# Patient Record
Sex: MALE | Race: WHITE | Employment: OTHER | URBAN - METROPOLITAN AREA
[De-identification: names, ages, dates, MRNs, and addresses within clinical notes are randomized per-mention and may not be internally consistent; named-entity substitution may affect disease eponyms.]

---

## 2017-02-27 RX ORDER — LEVOTHYROXINE SODIUM 0.1 MG/1
TABLET ORAL
Qty: 30 TABLET | Refills: 1 | Status: SHIPPED | OUTPATIENT
Start: 2017-02-27 | End: 2017-05-04 | Stop reason: SDUPTHER

## 2017-03-22 ENCOUNTER — OFFICE VISIT (OUTPATIENT)
Dept: FAMILY MEDICINE CLINIC | Age: 67
End: 2017-03-22

## 2017-03-22 VITALS
WEIGHT: 191 LBS | HEART RATE: 68 BPM | DIASTOLIC BLOOD PRESSURE: 82 MMHG | BODY MASS INDEX: 25.87 KG/M2 | SYSTOLIC BLOOD PRESSURE: 124 MMHG | TEMPERATURE: 97.7 F | HEIGHT: 72 IN | RESPIRATION RATE: 20 BRPM

## 2017-03-22 DIAGNOSIS — R97.20 ELEVATED PSA: ICD-10-CM

## 2017-03-22 DIAGNOSIS — I10 ESSENTIAL HYPERTENSION: Primary | ICD-10-CM

## 2017-03-22 DIAGNOSIS — E78.5 HYPERLIPIDEMIA, UNSPECIFIED HYPERLIPIDEMIA TYPE: ICD-10-CM

## 2017-03-22 DIAGNOSIS — E03.8 OTHER SPECIFIED HYPOTHYROIDISM: ICD-10-CM

## 2017-03-22 DIAGNOSIS — I10 ESSENTIAL HYPERTENSION: ICD-10-CM

## 2017-03-22 LAB
ALBUMIN SERPL-MCNC: 4.4 G/DL (ref 3.9–4.9)
ALP BLD-CCNC: 53 U/L (ref 35–104)
ALT SERPL-CCNC: 16 U/L (ref 0–41)
ANION GAP SERPL CALCULATED.3IONS-SCNC: 10 MEQ/L (ref 7–13)
AST SERPL-CCNC: 16 U/L (ref 0–40)
BASOPHILS ABSOLUTE: 0 K/UL (ref 0–0.2)
BASOPHILS RELATIVE PERCENT: 1 %
BILIRUB SERPL-MCNC: 0.8 MG/DL (ref 0–1.2)
BUN BLDV-MCNC: 15 MG/DL (ref 8–23)
CALCIUM SERPL-MCNC: 9.4 MG/DL (ref 8.6–10.2)
CHLORIDE BLD-SCNC: 103 MEQ/L (ref 98–107)
CHOLESTEROL, TOTAL: 193 MG/DL (ref 0–199)
CO2: 27 MEQ/L (ref 22–29)
CREAT SERPL-MCNC: 1.08 MG/DL (ref 0.7–1.2)
EOSINOPHILS ABSOLUTE: 0.2 K/UL (ref 0–0.7)
EOSINOPHILS RELATIVE PERCENT: 3.7 %
GFR AFRICAN AMERICAN: >60
GFR NON-AFRICAN AMERICAN: >60
GLOBULIN: 2 G/DL (ref 2.3–3.5)
GLUCOSE BLD-MCNC: 97 MG/DL (ref 74–109)
HCT VFR BLD CALC: 43.1 % (ref 42–52)
HDLC SERPL-MCNC: 42 MG/DL (ref 40–59)
HEMOGLOBIN: 14.6 G/DL (ref 14–18)
LDL CHOLESTEROL CALCULATED: 135 MG/DL (ref 0–129)
LYMPHOCYTES ABSOLUTE: 1.2 K/UL (ref 1–4.8)
LYMPHOCYTES RELATIVE PERCENT: 25.6 %
MCH RBC QN AUTO: 29.9 PG (ref 27–31.3)
MCHC RBC AUTO-ENTMCNC: 33.8 % (ref 33–37)
MCV RBC AUTO: 88.6 FL (ref 80–100)
MONOCYTES ABSOLUTE: 0.4 K/UL (ref 0.2–0.8)
MONOCYTES RELATIVE PERCENT: 8.6 %
NEUTROPHILS ABSOLUTE: 2.9 K/UL (ref 1.4–6.5)
NEUTROPHILS RELATIVE PERCENT: 61.1 %
PDW BLD-RTO: 13.6 % (ref 11.5–14.5)
PLATELET # BLD: 165 K/UL (ref 130–400)
POTASSIUM SERPL-SCNC: 4.4 MEQ/L (ref 3.5–5.1)
RBC # BLD: 4.87 M/UL (ref 4.7–6.1)
SODIUM BLD-SCNC: 140 MEQ/L (ref 132–144)
T4 FREE: 1.38 NG/DL (ref 0.93–1.7)
TOTAL PROTEIN: 6.4 G/DL (ref 6.4–8.1)
TRIGL SERPL-MCNC: 79 MG/DL (ref 0–200)
WBC # BLD: 4.7 K/UL (ref 4.8–10.8)

## 2017-03-22 PROCEDURE — G8427 DOCREV CUR MEDS BY ELIG CLIN: HCPCS | Performed by: FAMILY MEDICINE

## 2017-03-22 PROCEDURE — G8419 CALC BMI OUT NRM PARAM NOF/U: HCPCS | Performed by: FAMILY MEDICINE

## 2017-03-22 PROCEDURE — 99213 OFFICE O/P EST LOW 20 MIN: CPT | Performed by: FAMILY MEDICINE

## 2017-03-22 PROCEDURE — 1036F TOBACCO NON-USER: CPT | Performed by: FAMILY MEDICINE

## 2017-03-22 PROCEDURE — G8484 FLU IMMUNIZE NO ADMIN: HCPCS | Performed by: FAMILY MEDICINE

## 2017-03-22 PROCEDURE — 3017F COLORECTAL CA SCREEN DOC REV: CPT | Performed by: FAMILY MEDICINE

## 2017-03-22 RX ORDER — LISINOPRIL 10 MG/1
TABLET ORAL
Qty: 90 TABLET | Refills: 1 | Status: SHIPPED | OUTPATIENT
Start: 2017-03-22 | End: 2017-10-04 | Stop reason: SDUPTHER

## 2017-03-22 ASSESSMENT — ENCOUNTER SYMPTOMS
EYE DISCHARGE: 0
COUGH: 0
CONSTIPATION: 0
SHORTNESS OF BREATH: 0
ABDOMINAL PAIN: 0
DIARRHEA: 0
SORE THROAT: 0
SINUS PRESSURE: 0
EYE ITCHING: 0

## 2017-05-04 RX ORDER — LEVOTHYROXINE SODIUM 0.1 MG/1
TABLET ORAL
Qty: 30 TABLET | Refills: 5 | Status: SHIPPED | OUTPATIENT
Start: 2017-05-04 | End: 2017-11-08 | Stop reason: SDUPTHER

## 2017-05-25 RX ORDER — IBUPROFEN 800 MG/1
TABLET ORAL
Qty: 90 TABLET | Refills: 0 | Status: SHIPPED | OUTPATIENT
Start: 2017-05-25 | End: 2017-08-17 | Stop reason: SDUPTHER

## 2017-06-02 ENCOUNTER — OFFICE VISIT (OUTPATIENT)
Dept: UROLOGY | Age: 67
End: 2017-06-02

## 2017-06-02 VITALS
WEIGHT: 190 LBS | SYSTOLIC BLOOD PRESSURE: 128 MMHG | HEIGHT: 71 IN | DIASTOLIC BLOOD PRESSURE: 70 MMHG | HEART RATE: 78 BPM | BODY MASS INDEX: 26.6 KG/M2

## 2017-06-02 DIAGNOSIS — N40.1 BENIGN NON-NODULAR PROSTATIC HYPERPLASIA WITH LOWER URINARY TRACT SYMPTOMS: Primary | ICD-10-CM

## 2017-06-02 PROCEDURE — 99214 OFFICE O/P EST MOD 30 MIN: CPT | Performed by: UROLOGY

## 2017-06-02 RX ORDER — TAMSULOSIN HYDROCHLORIDE 0.4 MG/1
0.4 CAPSULE ORAL DAILY
Qty: 90 CAPSULE | Refills: 3 | Status: SHIPPED | OUTPATIENT
Start: 2017-06-02 | End: 2018-06-08 | Stop reason: SDUPTHER

## 2017-06-02 RX ORDER — FINASTERIDE 5 MG/1
5 TABLET, FILM COATED ORAL DAILY
Qty: 90 TABLET | Refills: 3 | Status: SHIPPED | OUTPATIENT
Start: 2017-06-02 | End: 2018-06-08 | Stop reason: SDUPTHER

## 2017-08-18 RX ORDER — IBUPROFEN 800 MG/1
TABLET ORAL
Qty: 90 TABLET | Refills: 1 | Status: SHIPPED | OUTPATIENT
Start: 2017-08-18 | End: 2017-11-06 | Stop reason: SDUPTHER

## 2017-10-04 ENCOUNTER — OFFICE VISIT (OUTPATIENT)
Dept: FAMILY MEDICINE CLINIC | Age: 67
End: 2017-10-04

## 2017-10-04 VITALS
SYSTOLIC BLOOD PRESSURE: 114 MMHG | TEMPERATURE: 97.1 F | BODY MASS INDEX: 25.19 KG/M2 | HEART RATE: 81 BPM | DIASTOLIC BLOOD PRESSURE: 78 MMHG | HEIGHT: 72 IN | WEIGHT: 186 LBS

## 2017-10-04 DIAGNOSIS — I10 ESSENTIAL HYPERTENSION: Primary | ICD-10-CM

## 2017-10-04 DIAGNOSIS — E78.5 HYPERLIPIDEMIA, UNSPECIFIED HYPERLIPIDEMIA TYPE: ICD-10-CM

## 2017-10-04 DIAGNOSIS — E03.9 HYPOTHYROIDISM, UNSPECIFIED TYPE: ICD-10-CM

## 2017-10-04 DIAGNOSIS — Z23 NEED FOR PNEUMOCOCCAL VACCINATION: ICD-10-CM

## 2017-10-04 DIAGNOSIS — I10 ESSENTIAL HYPERTENSION: ICD-10-CM

## 2017-10-04 LAB
ALBUMIN SERPL-MCNC: 4.4 G/DL (ref 3.9–4.9)
ALP BLD-CCNC: 50 U/L (ref 35–104)
ALT SERPL-CCNC: 16 U/L (ref 0–41)
ANION GAP SERPL CALCULATED.3IONS-SCNC: 15 MEQ/L (ref 7–13)
AST SERPL-CCNC: 17 U/L (ref 0–40)
BASOPHILS ABSOLUTE: 0.1 K/UL (ref 0–0.2)
BASOPHILS RELATIVE PERCENT: 1.7 %
BILIRUB SERPL-MCNC: 0.6 MG/DL (ref 0–1.2)
BUN BLDV-MCNC: 27 MG/DL (ref 8–23)
CALCIUM SERPL-MCNC: 9.2 MG/DL (ref 8.6–10.2)
CHLORIDE BLD-SCNC: 105 MEQ/L (ref 98–107)
CHOLESTEROL, TOTAL: 175 MG/DL (ref 0–199)
CO2: 22 MEQ/L (ref 22–29)
CREAT SERPL-MCNC: 0.96 MG/DL (ref 0.7–1.2)
EOSINOPHILS ABSOLUTE: 0.4 K/UL (ref 0–0.7)
EOSINOPHILS RELATIVE PERCENT: 6.5 %
GFR AFRICAN AMERICAN: >60
GFR NON-AFRICAN AMERICAN: >60
GLOBULIN: 2.3 G/DL (ref 2.3–3.5)
GLUCOSE BLD-MCNC: 101 MG/DL (ref 74–109)
HCT VFR BLD CALC: 44.3 % (ref 42–52)
HDLC SERPL-MCNC: 46 MG/DL (ref 40–59)
HEMOGLOBIN: 14.6 G/DL (ref 14–18)
LDL CHOLESTEROL CALCULATED: 116 MG/DL (ref 0–129)
LYMPHOCYTES ABSOLUTE: 1.2 K/UL (ref 1–4.8)
LYMPHOCYTES RELATIVE PERCENT: 20.5 %
MCH RBC QN AUTO: 30 PG (ref 27–31.3)
MCHC RBC AUTO-ENTMCNC: 32.9 % (ref 33–37)
MCV RBC AUTO: 91.3 FL (ref 80–100)
MONOCYTES ABSOLUTE: 0.4 K/UL (ref 0.2–0.8)
MONOCYTES RELATIVE PERCENT: 7.4 %
NEUTROPHILS ABSOLUTE: 3.7 K/UL (ref 1.4–6.5)
NEUTROPHILS RELATIVE PERCENT: 63.9 %
PDW BLD-RTO: 13.7 % (ref 11.5–14.5)
PLATELET # BLD: 170 K/UL (ref 130–400)
POTASSIUM SERPL-SCNC: 4.3 MEQ/L (ref 3.5–5.1)
RBC # BLD: 4.86 M/UL (ref 4.7–6.1)
SODIUM BLD-SCNC: 142 MEQ/L (ref 132–144)
T4 FREE: 1.47 NG/DL (ref 0.93–1.7)
TOTAL PROTEIN: 6.7 G/DL (ref 6.4–8.1)
TRIGL SERPL-MCNC: 63 MG/DL (ref 0–200)
WBC # BLD: 5.8 K/UL (ref 4.8–10.8)

## 2017-10-04 PROCEDURE — 99213 OFFICE O/P EST LOW 20 MIN: CPT | Performed by: FAMILY MEDICINE

## 2017-10-04 PROCEDURE — G8484 FLU IMMUNIZE NO ADMIN: HCPCS | Performed by: FAMILY MEDICINE

## 2017-10-04 PROCEDURE — 90670 PCV13 VACCINE IM: CPT | Performed by: FAMILY MEDICINE

## 2017-10-04 PROCEDURE — G8417 CALC BMI ABV UP PARAM F/U: HCPCS | Performed by: FAMILY MEDICINE

## 2017-10-04 PROCEDURE — G8427 DOCREV CUR MEDS BY ELIG CLIN: HCPCS | Performed by: FAMILY MEDICINE

## 2017-10-04 PROCEDURE — G0009 ADMIN PNEUMOCOCCAL VACCINE: HCPCS | Performed by: FAMILY MEDICINE

## 2017-10-04 PROCEDURE — 3017F COLORECTAL CA SCREEN DOC REV: CPT | Performed by: FAMILY MEDICINE

## 2017-10-04 PROCEDURE — 1123F ACP DISCUSS/DSCN MKR DOCD: CPT | Performed by: FAMILY MEDICINE

## 2017-10-04 PROCEDURE — 1036F TOBACCO NON-USER: CPT | Performed by: FAMILY MEDICINE

## 2017-10-04 PROCEDURE — 4040F PNEUMOC VAC/ADMIN/RCVD: CPT | Performed by: FAMILY MEDICINE

## 2017-10-04 RX ORDER — LISINOPRIL 10 MG/1
TABLET ORAL
Qty: 90 TABLET | Refills: 1 | Status: SHIPPED | OUTPATIENT
Start: 2017-10-04 | End: 2018-04-09 | Stop reason: SDUPTHER

## 2017-10-04 ASSESSMENT — ENCOUNTER SYMPTOMS
DIARRHEA: 0
CONSTIPATION: 0
SHORTNESS OF BREATH: 0
EYE ITCHING: 0
SORE THROAT: 0
SINUS PRESSURE: 0
ABDOMINAL PAIN: 0
EYE DISCHARGE: 0
COUGH: 0

## 2017-10-04 ASSESSMENT — PATIENT HEALTH QUESTIONNAIRE - PHQ9
SUM OF ALL RESPONSES TO PHQ QUESTIONS 1-9: 0
SUM OF ALL RESPONSES TO PHQ9 QUESTIONS 1 & 2: 0
2. FEELING DOWN, DEPRESSED OR HOPELESS: 0
1. LITTLE INTEREST OR PLEASURE IN DOING THINGS: 0

## 2017-10-04 NOTE — MR AVS SNAPSHOT
After Visit Summary             Yvette Barrow.   10/4/2017 8:00 AM   Office Visit    Description:  Male : 1950   Provider:  Jr Coronado MD   Department:  [address] [place] [address] PCP              Your Follow-Up and Future Appointments         Below is a list of your follow-up and future appointments. This may not be a complete list as you may have made appointments directly with providers that we are not aware of or your providers may have made some for you. Please call your providers to confirm appointments. It is important to keep your appointments. Please bring your current insurance card, photo ID, co-pay, and all medication bottles to your appointment. If self-pay, payment is expected at the time of service. Your To-Do List     Future Appointments Provider Department Dept Phone    2018 8:00 AM Jr Coronado MD [address] [place] [address] [phone]    Please arrive 15 minutes prior to appointment, bring photo ID and insurance card. 2018 9:00 AM Issa Dukes MD [facility] AT [facility] [phone]    Please arrive 15 minutes prior to appointment, bring photo ID and insurance card. Future Orders Complete By Expires    CBC With Auto Differential [YCX5897 Custom]  10/4/2017 10/4/2018    Comprehensive Metabolic Panel [SPU07 Custom]  10/4/2017 10/4/2018    Lipid Panel [LAB18 Custom]  10/4/2017 10/4/2018    T4, Free [GQW582 Custom]  10/4/2017 10/4/2018    Follow-Up    Return in about 6 months (around 2018).          Information from Your Visit        Department     Name Address Phone Fax    [facility] [address] PCP [address] [place] [address], [place] [address] [address] [place] [address] [phone]      You Were Seen for:         Comments    Essential hypertension   [732646]         Vital Signs     Blood Pressure Pulse Temperature Height Weight Body Mass Index    114/78 81 97.1 °F (36.2 °C) (Temporal) 6' (1.829 m) 186 lb (84.4 kg) 25.23 kg/m2 Smoking Status                   Never Smoker           Additional Information about your Body Mass Index (BMI)           Your BMI as listed above is considered overweight (25.0-29.9). BMI is an estimate of body fat, calculated from your height and weight. The higher your BMI, the greater your risk of heart disease, high blood pressure, type 2 diabetes, stroke, gallstones, arthritis, sleep apnea, and certain cancers. BMI is not perfect. It may overestimate body fat in athletes and people who are more muscular. If your body fat is high you can improve your BMI by decreasing your calorie intake and becoming more physically active. Learn more at: [email or web address]             Where to Get Your Medications      These medications were sent to [facility] [address] [facility] [address] [place] [address] [place] [facility]", [address]  [address], [facility] [address]     Phone:  [phone]     lisinopril 10 MG tablet         Your Current Medications Are              lisinopril (PRINIVIL;ZESTRIL) 10 MG tablet Take 1 tablet by mouth daily. ibuprofen (ADVIL;MOTRIN) 800 MG tablet TAKE 1 TABLET EVERY 8 HOURS AS NEEDED FOR PAIN    tamsulosin (FLOMAX) 0.4 MG capsule Take 1 capsule by mouth daily    finasteride (PROSCAR) 5 MG tablet Take 1 tablet by mouth daily    Avanafil 200 MG TABS Take 200 mg by mouth daily as needed (as needed)    levothyroxine (SYNTHROID) 100 MCG tablet Take 1 tablet by mouth Daily    Suvorexant (BELSOMRA) 20 MG TABS Take 1 tablet by mouth nightly as needed (insomnia)    Cholecalciferol (VITAMIN D3) 5000 UNITS TABS Take by mouth daily    Pyridoxine HCl (VITAMIN B-6 PO) Take  by mouth. Ascorbic Acid (VITAMIN C) 500 MG CAPS Take 500 mg by mouth daily. Bilberry, Vaccinium myrtillus, (BILBERRY PO) Take 1,000 mg by mouth daily. Omega-3 Fatty Acids (OMEGA 3 PO) Take  by mouth. Cyanocobalamin (VITAMIN B-12 PO) Take  by mouth.         Allergies Bee Venom Swelling      We Ordered/Performed the following           Pneumococcal conjugate vaccine 13-valent     Comments:    NDC 7345-8118-64 Use for package 10 PREFILLED SYRINGES PER PACKAGE           Additional Information        Basic Information     Date Of Birth Sex Race Ethnicity Preferred Language    1950 Male White Non-/Non  English      Problem List as of 10/4/2017  Date Reviewed: 3/22/2017                Essential hypertension    Rotator cuff syndrome of left shoulder    Hypothyroidism    Elevated PSA    BPH (benign prostatic hypertrophy)    Osteoarthritis    Erectile dysfunction    Hyperlipidemia      Immunizations as of 10/4/2017     Name Date    Pneumococcal 13-valent Conjugate (Scott Khat) 10/4/2017    Tdap (Boostrix, Adacel) 12/30/2013      Preventive Care        Date Due    Zoster Vaccine 10/10/2017 (Originally 5/6/2012)    Yearly Flu Vaccine (1) 10/10/2017 (Originally 9/1/2017)    Hepatitis C screening is recommended for all adults regardless of risk factors born between [facility] at least once (lifetime) who have never been tested. 10/10/2017 (Originally 1950)    HIV screening is recommended for all people regardless of risk factors  aged 15-65 years at least once (lifetime) who have never been HIV tested. 10/10/2017 (Originally 5/6/1967)    Pneumococcal Vaccines (two) for all adults aged 72 and over (2 of 2 - PPSV23) 10/4/2018    Cholesterol Screening 3/22/2022    Colonoscopy 5/13/2023    Tetanus Combination Vaccine (2 - Td) 12/30/2023            RIWIt Signup           Our records indicate that you have an active Tripbod account. You can view your After Visit Summary by going to [email or web address]. and logging in with your Tripbod username and password.       If you don't have a Tripbod username and password but a parent or guardian has access to your record, the parent or guardian should login

## 2017-10-04 NOTE — PROGRESS NOTES
Subjective  Chloe De La O., 74 y.o. male presents today with:  No chief complaint on file. HPI    Patient here for follow-up of hypertension hyperlipidemia doing well no complaints. Diabetes is uncontrolled. BP Readings from Last 3 Encounters:   06/02/17 128/70   03/22/17 124/82   09/30/16 124/82     Hypertension:  Home blood pressure monitoring: Yes - . He is adherent to a low sodium diet. Patient denies chest pain. Antihypertensive medication side effects: no medication side effects noted. Use of agents associated with hypertension: none. Sodium (mEq/L)   Date Value   03/22/2017 140    BUN (mg/dL)   Date Value   03/22/2017 15    Glucose (mg/dL)   Date Value   03/22/2017 97   03/30/2012 112      Potassium (mEq/L)   Date Value   03/22/2017 4.4    CREATININE (mg/dL)   Date Value   03/22/2017 1.08         Lab Results   Component Value Date    CHOL 193 03/22/2017    TRIG 79 03/22/2017    HDL 42 03/22/2017    LDLCALC 135 (H) 03/22/2017     Lab Results   Component Value Date    ALT 16 03/22/2017    AST 16 03/22/2017          No other questions and or concerns for today's visit      Review of Systems   Constitutional: Negative for appetite change, fatigue and fever. HENT: Negative for congestion, ear pain, sinus pressure and sore throat. Eyes: Negative for discharge and itching. Respiratory: Negative for cough and shortness of breath. Cardiovascular: Negative for chest pain and palpitations. Gastrointestinal: Negative for abdominal pain, constipation and diarrhea. Endocrine: Negative for polydipsia. Genitourinary: Negative for difficulty urinating. Musculoskeletal: Negative for gait problem. Skin: Negative. Neurological: Negative for dizziness. Hematological: Negative. Psychiatric/Behavioral: Negative.           Past Medical History:   Diagnosis Date    Borderline hypertension     BPH (benign prostatic hypertrophy)     Elevated PSA  Erectile dysfunction     HTN (hypertension) 1/19/2015    Hyperlipidemia     Hypothyroidism     Osteoarthritis     Rotator cuff syndrome of left shoulder 9/30/2013     Past Surgical History:   Procedure Laterality Date    COLONOSCOPY  05/13/2013    DR. GIRON [address]      right     Social History     Social History    Marital status: Single     Spouse name: N/A    Number of children: N/A    Years of education: N/A     Occupational History    Not on file. Social History Main Topics    Smoking status: Never Smoker    Smokeless tobacco: Never Used    Alcohol use Yes    Drug use: No    Sexual activity: Yes     Partners: Female     Other Topics Concern    Not on file     Social History Narrative     Family History   Problem Relation Age of Onset    High Blood Pressure Mother      Allergies   Allergen Reactions    Bee Venom Swelling     Current Outpatient Prescriptions   Medication Sig Dispense Refill    ibuprofen (ADVIL;MOTRIN) 800 MG tablet TAKE 1 TABLET EVERY 8 HOURS AS NEEDED FOR PAIN 90 tablet 1    tamsulosin (FLOMAX) 0.4 MG capsule Take 1 capsule by mouth daily 90 capsule 3    finasteride (PROSCAR) 5 MG tablet Take 1 tablet by mouth daily 90 tablet 3    Avanafil 200 MG TABS Take 200 mg by mouth daily as needed (as needed) 3 tablet 0    levothyroxine (SYNTHROID) 100 MCG tablet Take 1 tablet by mouth Daily 30 tablet 5    lisinopril (PRINIVIL;ZESTRIL) 10 MG tablet Take 1 tablet by mouth daily. 90 tablet 1    Suvorexant (BELSOMRA) 20 MG TABS Take 1 tablet by mouth nightly as needed (insomnia) 30 tablet 3    Cholecalciferol (VITAMIN D3) 5000 UNITS TABS Take by mouth daily      Pyridoxine HCl (VITAMIN B-6 PO) Take  by mouth.  Ascorbic Acid (VITAMIN C) 500 MG CAPS Take 500 mg by mouth daily.  Bilberry, Vaccinium myrtillus, (BILBERRY PO) Take 1,000 mg by mouth daily.  Omega-3 Fatty Acids (OMEGA 3 PO) Take  by mouth.         Cyanocobalamin (VITAMIN

## 2017-11-06 RX ORDER — IBUPROFEN 800 MG/1
TABLET ORAL
Qty: 90 TABLET | Refills: 1 | Status: SHIPPED | OUTPATIENT
Start: 2017-11-06 | End: 2018-04-16 | Stop reason: SDUPTHER

## 2017-11-08 RX ORDER — LEVOTHYROXINE SODIUM 0.1 MG/1
TABLET ORAL
Qty: 30 TABLET | Refills: 3 | Status: SHIPPED | OUTPATIENT
Start: 2017-11-08 | End: 2018-03-08 | Stop reason: SDUPTHER

## 2018-03-08 RX ORDER — LEVOTHYROXINE SODIUM 0.1 MG/1
TABLET ORAL
Qty: 30 TABLET | Refills: 5 | Status: SHIPPED | OUTPATIENT
Start: 2018-03-08 | End: 2018-09-04 | Stop reason: SDUPTHER

## 2018-04-09 RX ORDER — LISINOPRIL 10 MG/1
TABLET ORAL
Qty: 90 TABLET | Refills: 0 | Status: SHIPPED | OUTPATIENT
Start: 2018-04-09 | End: 2018-07-10 | Stop reason: SDUPTHER

## 2018-04-16 ENCOUNTER — OFFICE VISIT (OUTPATIENT)
Dept: FAMILY MEDICINE CLINIC | Age: 68
End: 2018-04-16
Payer: MEDICARE

## 2018-04-16 VITALS
RESPIRATION RATE: 16 BRPM | HEIGHT: 71 IN | BODY MASS INDEX: 26.6 KG/M2 | HEART RATE: 74 BPM | DIASTOLIC BLOOD PRESSURE: 84 MMHG | WEIGHT: 190 LBS | SYSTOLIC BLOOD PRESSURE: 122 MMHG | OXYGEN SATURATION: 98 %

## 2018-04-16 DIAGNOSIS — R97.20 ELEVATED PSA: ICD-10-CM

## 2018-04-16 DIAGNOSIS — I10 ESSENTIAL HYPERTENSION: ICD-10-CM

## 2018-04-16 DIAGNOSIS — N52.9 ERECTILE DYSFUNCTION, UNSPECIFIED ERECTILE DYSFUNCTION TYPE: Primary | ICD-10-CM

## 2018-04-16 DIAGNOSIS — E03.9 HYPOTHYROIDISM, UNSPECIFIED TYPE: ICD-10-CM

## 2018-04-16 DIAGNOSIS — N40.1 BENIGN PROSTATIC HYPERPLASIA WITH LOWER URINARY TRACT SYMPTOMS, SYMPTOM DETAILS UNSPECIFIED: ICD-10-CM

## 2018-04-16 DIAGNOSIS — E78.5 HYPERLIPIDEMIA, UNSPECIFIED HYPERLIPIDEMIA TYPE: ICD-10-CM

## 2018-04-16 DIAGNOSIS — R00.2 PALPITATIONS: ICD-10-CM

## 2018-04-16 DIAGNOSIS — N52.9 ERECTILE DYSFUNCTION, UNSPECIFIED ERECTILE DYSFUNCTION TYPE: ICD-10-CM

## 2018-04-16 DIAGNOSIS — M75.102 ROTATOR CUFF SYNDROME OF LEFT SHOULDER: ICD-10-CM

## 2018-04-16 LAB
ALBUMIN SERPL-MCNC: 4.6 G/DL (ref 3.9–4.9)
ALP BLD-CCNC: 47 U/L (ref 35–104)
ALT SERPL-CCNC: 16 U/L (ref 0–41)
ANION GAP SERPL CALCULATED.3IONS-SCNC: 16 MEQ/L (ref 7–13)
AST SERPL-CCNC: 15 U/L (ref 0–40)
BASOPHILS ABSOLUTE: 0.1 K/UL (ref 0–0.2)
BASOPHILS RELATIVE PERCENT: 1.2 %
BILIRUB SERPL-MCNC: 0.8 MG/DL (ref 0–1.2)
BUN BLDV-MCNC: 22 MG/DL (ref 8–23)
CALCIUM SERPL-MCNC: 9.6 MG/DL (ref 8.6–10.2)
CHLORIDE BLD-SCNC: 104 MEQ/L (ref 98–107)
CHOLESTEROL, TOTAL: 201 MG/DL (ref 0–199)
CO2: 23 MEQ/L (ref 22–29)
CREAT SERPL-MCNC: 1.07 MG/DL (ref 0.7–1.2)
EOSINOPHILS ABSOLUTE: 0.3 K/UL (ref 0–0.7)
EOSINOPHILS RELATIVE PERCENT: 5.1 %
GFR AFRICAN AMERICAN: >60
GFR NON-AFRICAN AMERICAN: >60
GLOBULIN: 2.1 G/DL (ref 2.3–3.5)
GLUCOSE BLD-MCNC: 95 MG/DL (ref 74–109)
HCT VFR BLD CALC: 44.1 % (ref 42–52)
HDLC SERPL-MCNC: 46 MG/DL (ref 40–59)
HEMOGLOBIN: 14.8 G/DL (ref 14–18)
LDL CHOLESTEROL CALCULATED: 139 MG/DL (ref 0–129)
LYMPHOCYTES ABSOLUTE: 1.2 K/UL (ref 1–4.8)
LYMPHOCYTES RELATIVE PERCENT: 22.7 %
MCH RBC QN AUTO: 30.6 PG (ref 27–31.3)
MCHC RBC AUTO-ENTMCNC: 33.6 % (ref 33–37)
MCV RBC AUTO: 91 FL (ref 80–100)
MONOCYTES ABSOLUTE: 0.4 K/UL (ref 0.2–0.8)
MONOCYTES RELATIVE PERCENT: 8.1 %
NEUTROPHILS ABSOLUTE: 3.2 K/UL (ref 1.4–6.5)
NEUTROPHILS RELATIVE PERCENT: 62.9 %
PDW BLD-RTO: 14.1 % (ref 11.5–14.5)
PLATELET # BLD: 177 K/UL (ref 130–400)
POTASSIUM SERPL-SCNC: 4.9 MEQ/L (ref 3.5–5.1)
PROSTATE SPECIFIC ANTIGEN: 2.09 NG/ML (ref 0–5.4)
RBC # BLD: 4.84 M/UL (ref 4.7–6.1)
SODIUM BLD-SCNC: 143 MEQ/L (ref 132–144)
T4 FREE: 1.47 NG/DL (ref 0.93–1.7)
TOTAL PROTEIN: 6.7 G/DL (ref 6.4–8.1)
TRIGL SERPL-MCNC: 82 MG/DL (ref 0–200)
WBC # BLD: 5.1 K/UL (ref 4.8–10.8)

## 2018-04-16 PROCEDURE — G8419 CALC BMI OUT NRM PARAM NOF/U: HCPCS | Performed by: FAMILY MEDICINE

## 2018-04-16 PROCEDURE — 93000 ELECTROCARDIOGRAM COMPLETE: CPT | Performed by: FAMILY MEDICINE

## 2018-04-16 PROCEDURE — 3017F COLORECTAL CA SCREEN DOC REV: CPT | Performed by: FAMILY MEDICINE

## 2018-04-16 PROCEDURE — 99214 OFFICE O/P EST MOD 30 MIN: CPT | Performed by: FAMILY MEDICINE

## 2018-04-16 PROCEDURE — 1036F TOBACCO NON-USER: CPT | Performed by: FAMILY MEDICINE

## 2018-04-16 PROCEDURE — 1123F ACP DISCUSS/DSCN MKR DOCD: CPT | Performed by: FAMILY MEDICINE

## 2018-04-16 PROCEDURE — 4040F PNEUMOC VAC/ADMIN/RCVD: CPT | Performed by: FAMILY MEDICINE

## 2018-04-16 PROCEDURE — G8427 DOCREV CUR MEDS BY ELIG CLIN: HCPCS | Performed by: FAMILY MEDICINE

## 2018-04-16 RX ORDER — IBUPROFEN 800 MG/1
TABLET ORAL
Qty: 90 TABLET | Refills: 1 | Status: SHIPPED | OUTPATIENT
Start: 2018-04-16 | End: 2018-08-09 | Stop reason: SDUPTHER

## 2018-04-16 RX ORDER — MULTIVITAMIN WITH IRON
250 TABLET ORAL DAILY
COMMUNITY

## 2018-04-16 ASSESSMENT — ENCOUNTER SYMPTOMS
DIARRHEA: 0
ABDOMINAL PAIN: 0
NAUSEA: 0
EYES NEGATIVE: 1
CONSTIPATION: 0
SHORTNESS OF BREATH: 0
COUGH: 0

## 2018-04-18 LAB — TESTOSTERONE TOTAL-MALE: 603 NG/DL (ref 300–720)

## 2018-04-18 ASSESSMENT — ENCOUNTER SYMPTOMS
ABDOMINAL PAIN: 0
EYE ITCHING: 0
CONSTIPATION: 0
DIARRHEA: 0
COUGH: 0
SORE THROAT: 0
SINUS PRESSURE: 0
SHORTNESS OF BREATH: 0
EYE DISCHARGE: 0

## 2018-04-19 ENCOUNTER — OFFICE VISIT (OUTPATIENT)
Dept: FAMILY MEDICINE CLINIC | Age: 68
End: 2018-04-19
Payer: MEDICARE

## 2018-04-19 VITALS
HEIGHT: 71 IN | RESPIRATION RATE: 20 BRPM | SYSTOLIC BLOOD PRESSURE: 118 MMHG | HEART RATE: 72 BPM | WEIGHT: 193 LBS | DIASTOLIC BLOOD PRESSURE: 72 MMHG | BODY MASS INDEX: 27.02 KG/M2 | TEMPERATURE: 97.2 F

## 2018-04-19 DIAGNOSIS — D49.2 SKIN NEOPLASM: Primary | ICD-10-CM

## 2018-04-19 PROCEDURE — 11423 EXC H-F-NK-SP B9+MARG 2.1-3: CPT | Performed by: FAMILY MEDICINE

## 2018-04-30 ENCOUNTER — OFFICE VISIT (OUTPATIENT)
Dept: FAMILY MEDICINE CLINIC | Age: 68
End: 2018-04-30

## 2018-04-30 VITALS
HEART RATE: 96 BPM | SYSTOLIC BLOOD PRESSURE: 124 MMHG | HEIGHT: 71 IN | TEMPERATURE: 97 F | BODY MASS INDEX: 26.6 KG/M2 | WEIGHT: 190 LBS | RESPIRATION RATE: 12 BRPM | DIASTOLIC BLOOD PRESSURE: 68 MMHG

## 2018-04-30 DIAGNOSIS — Z48.02 VISIT FOR SUTURE REMOVAL: Primary | ICD-10-CM

## 2018-04-30 PROCEDURE — 99024 POSTOP FOLLOW-UP VISIT: CPT | Performed by: FAMILY MEDICINE

## 2018-04-30 ASSESSMENT — ENCOUNTER SYMPTOMS
DIARRHEA: 0
COUGH: 0
EYE ITCHING: 0
SINUS PRESSURE: 0
ABDOMINAL PAIN: 0
EYE DISCHARGE: 0
SORE THROAT: 0
SHORTNESS OF BREATH: 0
CONSTIPATION: 0

## 2018-04-30 ASSESSMENT — PATIENT HEALTH QUESTIONNAIRE - PHQ9
SUM OF ALL RESPONSES TO PHQ QUESTIONS 1-9: 0
1. LITTLE INTEREST OR PLEASURE IN DOING THINGS: 0
SUM OF ALL RESPONSES TO PHQ9 QUESTIONS 1 & 2: 0
2. FEELING DOWN, DEPRESSED OR HOPELESS: 0

## 2018-06-04 ENCOUNTER — TELEPHONE (OUTPATIENT)
Dept: UROLOGY | Age: 68
End: 2018-06-04

## 2018-06-08 ENCOUNTER — OFFICE VISIT (OUTPATIENT)
Dept: UROLOGY | Age: 68
End: 2018-06-08
Payer: MEDICARE

## 2018-06-08 VITALS
HEIGHT: 71 IN | DIASTOLIC BLOOD PRESSURE: 60 MMHG | BODY MASS INDEX: 25.9 KG/M2 | HEART RATE: 71 BPM | SYSTOLIC BLOOD PRESSURE: 114 MMHG | WEIGHT: 185 LBS

## 2018-06-08 DIAGNOSIS — N40.1 BENIGN PROSTATIC HYPERPLASIA WITH LOWER URINARY TRACT SYMPTOMS, SYMPTOM DETAILS UNSPECIFIED: Primary | ICD-10-CM

## 2018-06-08 PROCEDURE — 1036F TOBACCO NON-USER: CPT | Performed by: UROLOGY

## 2018-06-08 PROCEDURE — 1123F ACP DISCUSS/DSCN MKR DOCD: CPT | Performed by: UROLOGY

## 2018-06-08 PROCEDURE — G8417 CALC BMI ABV UP PARAM F/U: HCPCS | Performed by: UROLOGY

## 2018-06-08 PROCEDURE — 4040F PNEUMOC VAC/ADMIN/RCVD: CPT | Performed by: UROLOGY

## 2018-06-08 PROCEDURE — G8427 DOCREV CUR MEDS BY ELIG CLIN: HCPCS | Performed by: UROLOGY

## 2018-06-08 PROCEDURE — 3017F COLORECTAL CA SCREEN DOC REV: CPT | Performed by: UROLOGY

## 2018-06-08 PROCEDURE — 99213 OFFICE O/P EST LOW 20 MIN: CPT | Performed by: UROLOGY

## 2018-06-08 RX ORDER — FINASTERIDE 5 MG/1
5 TABLET, FILM COATED ORAL DAILY
Qty: 90 TABLET | Refills: 3 | Status: SHIPPED | OUTPATIENT
Start: 2018-06-08 | End: 2019-06-10 | Stop reason: SDUPTHER

## 2018-06-08 RX ORDER — TAMSULOSIN HYDROCHLORIDE 0.4 MG/1
0.4 CAPSULE ORAL DAILY
Qty: 90 CAPSULE | Refills: 3 | Status: SHIPPED | OUTPATIENT
Start: 2018-06-08 | End: 2019-06-10 | Stop reason: SDUPTHER

## 2018-07-10 RX ORDER — LISINOPRIL 10 MG/1
TABLET ORAL
Qty: 90 TABLET | Refills: 1 | Status: SHIPPED | OUTPATIENT
Start: 2018-07-10 | End: 2018-11-06

## 2018-08-09 DIAGNOSIS — M75.102 ROTATOR CUFF SYNDROME OF LEFT SHOULDER: ICD-10-CM

## 2018-08-09 RX ORDER — IBUPROFEN 800 MG/1
TABLET ORAL
Qty: 90 TABLET | Refills: 0 | Status: SHIPPED | OUTPATIENT
Start: 2018-08-09 | End: 2018-10-29 | Stop reason: SDUPTHER

## 2018-09-04 RX ORDER — LEVOTHYROXINE SODIUM 0.1 MG/1
TABLET ORAL
Qty: 30 TABLET | Refills: 2 | Status: SHIPPED | OUTPATIENT
Start: 2018-09-04 | End: 2018-11-06 | Stop reason: SDUPTHER

## 2018-10-29 DIAGNOSIS — M75.102 ROTATOR CUFF SYNDROME OF LEFT SHOULDER: ICD-10-CM

## 2018-10-30 RX ORDER — IBUPROFEN 800 MG/1
TABLET ORAL
Qty: 90 TABLET | Refills: 0 | Status: SHIPPED | OUTPATIENT
Start: 2018-10-30 | End: 2018-11-06 | Stop reason: SDUPTHER

## 2018-11-06 ENCOUNTER — OFFICE VISIT (OUTPATIENT)
Dept: FAMILY MEDICINE CLINIC | Age: 68
End: 2018-11-06
Payer: MEDICARE

## 2018-11-06 VITALS
SYSTOLIC BLOOD PRESSURE: 110 MMHG | RESPIRATION RATE: 20 BRPM | HEART RATE: 85 BPM | DIASTOLIC BLOOD PRESSURE: 65 MMHG | BODY MASS INDEX: 25.9 KG/M2 | TEMPERATURE: 97.1 F | WEIGHT: 185 LBS | HEIGHT: 71 IN

## 2018-11-06 DIAGNOSIS — E03.9 HYPOTHYROIDISM, UNSPECIFIED TYPE: ICD-10-CM

## 2018-11-06 DIAGNOSIS — I10 ESSENTIAL HYPERTENSION: Primary | ICD-10-CM

## 2018-11-06 DIAGNOSIS — Z23 NEED FOR PNEUMOCOCCAL VACCINATION: ICD-10-CM

## 2018-11-06 DIAGNOSIS — M75.102 ROTATOR CUFF SYNDROME OF LEFT SHOULDER: ICD-10-CM

## 2018-11-06 LAB
T4 FREE: 1.15 NG/DL (ref 0.93–1.7)
TSH SERPL DL<=0.05 MIU/L-ACNC: 9.82 UIU/ML (ref 0.27–4.2)

## 2018-11-06 PROCEDURE — G8417 CALC BMI ABV UP PARAM F/U: HCPCS | Performed by: FAMILY MEDICINE

## 2018-11-06 PROCEDURE — 99214 OFFICE O/P EST MOD 30 MIN: CPT | Performed by: FAMILY MEDICINE

## 2018-11-06 PROCEDURE — 1036F TOBACCO NON-USER: CPT | Performed by: FAMILY MEDICINE

## 2018-11-06 PROCEDURE — G8484 FLU IMMUNIZE NO ADMIN: HCPCS | Performed by: FAMILY MEDICINE

## 2018-11-06 PROCEDURE — 90732 PPSV23 VACC 2 YRS+ SUBQ/IM: CPT | Performed by: FAMILY MEDICINE

## 2018-11-06 PROCEDURE — 1101F PT FALLS ASSESS-DOCD LE1/YR: CPT | Performed by: FAMILY MEDICINE

## 2018-11-06 PROCEDURE — G8427 DOCREV CUR MEDS BY ELIG CLIN: HCPCS | Performed by: FAMILY MEDICINE

## 2018-11-06 PROCEDURE — 4040F PNEUMOC VAC/ADMIN/RCVD: CPT | Performed by: FAMILY MEDICINE

## 2018-11-06 PROCEDURE — 1123F ACP DISCUSS/DSCN MKR DOCD: CPT | Performed by: FAMILY MEDICINE

## 2018-11-06 PROCEDURE — G0009 ADMIN PNEUMOCOCCAL VACCINE: HCPCS | Performed by: FAMILY MEDICINE

## 2018-11-06 PROCEDURE — 3017F COLORECTAL CA SCREEN DOC REV: CPT | Performed by: FAMILY MEDICINE

## 2018-11-06 RX ORDER — IBUPROFEN 800 MG/1
TABLET ORAL
Qty: 90 TABLET | Refills: 3 | Status: SHIPPED | OUTPATIENT
Start: 2018-11-06 | End: 2019-02-06 | Stop reason: SDUPTHER

## 2018-11-06 RX ORDER — LEVOTHYROXINE SODIUM 0.1 MG/1
TABLET ORAL
Qty: 30 TABLET | Refills: 6 | Status: SHIPPED | OUTPATIENT
Start: 2018-11-06 | End: 2018-11-08

## 2018-11-06 RX ORDER — LISINOPRIL 10 MG/1
TABLET ORAL
Qty: 90 TABLET | Refills: 1 | Status: CANCELLED | OUTPATIENT
Start: 2018-11-06

## 2018-11-06 ASSESSMENT — ENCOUNTER SYMPTOMS
SHORTNESS OF BREATH: 0
EYES NEGATIVE: 1
DIARRHEA: 0
NAUSEA: 0
CONSTIPATION: 0
ABDOMINAL PAIN: 0
COUGH: 0

## 2018-11-06 NOTE — PROGRESS NOTES
Constitutional: He is oriented to person, place, and time. He appears well-developed and well-nourished. HENT:   Head: Normocephalic and atraumatic. Right Ear: External ear normal.   Left Ear: External ear normal.   Mouth/Throat: Oropharynx is clear and moist.   Eyes: Pupils are equal, round, and reactive to light. Conjunctivae and EOM are normal.   Neck: Normal range of motion. Neck supple. No JVD present. No thyromegaly present. Cardiovascular: Normal rate, regular rhythm, normal heart sounds and intact distal pulses. Exam reveals no gallop and no friction rub. No murmur heard. Pulmonary/Chest: Effort normal and breath sounds normal. No respiratory distress. Abdominal: Bowel sounds are normal. He exhibits no mass. There is no tenderness. Musculoskeletal: Normal range of motion. Neurological: He is alert and oriented to person, place, and time. Skin: Skin is warm and dry. Psychiatric: He has a normal mood and affect. His behavior is normal.     His blood pressure is normal off the lisinopril will keep an eye on that he is doing okay since takes it day by day overall he understands what happens and he realizes that she refused the testing early on that might have done some good  Assessment & Plan    Diagnosis Orders   1. Essential hypertension     2. Hypothyroidism, unspecified type  TSH Without Reflex    T4, Free   3. Rotator cuff syndrome of left shoulder     4.  Need for pneumococcal vaccination  Pneumococcal polysaccharide vaccine 23-valent greater than or equal to 3yo subcutaneous/IM     Orders Placed This Encounter   Procedures    Pneumococcal polysaccharide vaccine 23-valent greater than or equal to 3yo subcutaneous/IM     [id number]    TSH Without Reflex     Standing Status:   Future     Number of Occurrences:   1     Standing Expiration Date:   11/6/2019    T4, Free     Standing Status:   Future     Number of Occurrences:   1     Standing Expiration Date:   11/6/2019     Orders

## 2018-11-08 ENCOUNTER — TELEPHONE (OUTPATIENT)
Dept: FAMILY MEDICINE CLINIC | Age: 68
End: 2018-11-08

## 2018-11-08 RX ORDER — LEVOTHYROXINE SODIUM 112 UG/1
112 TABLET ORAL DAILY
Qty: 30 TABLET | Refills: 3 | Status: SHIPPED | OUTPATIENT
Start: 2018-11-08 | End: 2019-03-07 | Stop reason: SDUPTHER

## 2019-02-06 ENCOUNTER — OFFICE VISIT (OUTPATIENT)
Dept: FAMILY MEDICINE CLINIC | Age: 69
End: 2019-02-06
Payer: MEDICARE

## 2019-02-06 VITALS
DIASTOLIC BLOOD PRESSURE: 72 MMHG | HEIGHT: 71 IN | WEIGHT: 187 LBS | TEMPERATURE: 97.1 F | BODY MASS INDEX: 26.18 KG/M2 | RESPIRATION RATE: 16 BRPM | HEART RATE: 62 BPM | SYSTOLIC BLOOD PRESSURE: 98 MMHG

## 2019-02-06 DIAGNOSIS — I10 ESSENTIAL HYPERTENSION: ICD-10-CM

## 2019-02-06 DIAGNOSIS — E03.9 HYPOTHYROIDISM, UNSPECIFIED TYPE: ICD-10-CM

## 2019-02-06 DIAGNOSIS — I10 ESSENTIAL HYPERTENSION: Primary | ICD-10-CM

## 2019-02-06 DIAGNOSIS — E78.5 HYPERLIPIDEMIA, UNSPECIFIED HYPERLIPIDEMIA TYPE: ICD-10-CM

## 2019-02-06 LAB
ALBUMIN SERPL-MCNC: 4.6 G/DL (ref 3.9–4.9)
ALP BLD-CCNC: 48 U/L (ref 35–104)
ALT SERPL-CCNC: 22 U/L (ref 0–41)
ANION GAP SERPL CALCULATED.3IONS-SCNC: 14 MEQ/L (ref 7–13)
AST SERPL-CCNC: 21 U/L (ref 0–40)
BASOPHILS ABSOLUTE: 0.1 K/UL (ref 0–0.2)
BASOPHILS RELATIVE PERCENT: 1.9 %
BILIRUB SERPL-MCNC: 0.7 MG/DL (ref 0–1.2)
BUN BLDV-MCNC: 18 MG/DL (ref 8–23)
CALCIUM SERPL-MCNC: 9.3 MG/DL (ref 8.6–10.2)
CHLORIDE BLD-SCNC: 103 MEQ/L (ref 98–107)
CHOLESTEROL, TOTAL: 140 MG/DL (ref 0–199)
CO2: 25 MEQ/L (ref 22–29)
CREAT SERPL-MCNC: 0.92 MG/DL (ref 0.7–1.2)
EOSINOPHILS ABSOLUTE: 0.2 K/UL (ref 0–0.7)
EOSINOPHILS RELATIVE PERCENT: 4.8 %
GFR AFRICAN AMERICAN: >60
GFR NON-AFRICAN AMERICAN: >60
GLOBULIN: 2 G/DL (ref 2.3–3.5)
GLUCOSE BLD-MCNC: 78 MG/DL (ref 74–109)
HCT VFR BLD CALC: 46.4 % (ref 42–52)
HDLC SERPL-MCNC: 46 MG/DL (ref 40–59)
HEMOGLOBIN: 15.5 G/DL (ref 14–18)
LDL CHOLESTEROL CALCULATED: 84 MG/DL (ref 0–129)
LYMPHOCYTES ABSOLUTE: 1.1 K/UL (ref 1–4.8)
LYMPHOCYTES RELATIVE PERCENT: 25.3 %
MCH RBC QN AUTO: 30.6 PG (ref 27–31.3)
MCHC RBC AUTO-ENTMCNC: 33.5 % (ref 33–37)
MCV RBC AUTO: 91.3 FL (ref 80–100)
MONOCYTES ABSOLUTE: 0.5 K/UL (ref 0.2–0.8)
MONOCYTES RELATIVE PERCENT: 10.6 %
NEUTROPHILS ABSOLUTE: 2.6 K/UL (ref 1.4–6.5)
NEUTROPHILS RELATIVE PERCENT: 57.4 %
PDW BLD-RTO: 13.8 % (ref 11.5–14.5)
PLATELET # BLD: 169 K/UL (ref 130–400)
POTASSIUM SERPL-SCNC: 4.4 MEQ/L (ref 3.5–5.1)
RBC # BLD: 5.08 M/UL (ref 4.7–6.1)
SODIUM BLD-SCNC: 142 MEQ/L (ref 132–144)
T4 FREE: 1.34 NG/DL (ref 0.93–1.7)
TOTAL PROTEIN: 6.6 G/DL (ref 6.4–8.1)
TRIGL SERPL-MCNC: 51 MG/DL (ref 0–200)
TSH SERPL DL<=0.05 MIU/L-ACNC: 3.29 UIU/ML (ref 0.27–4.2)
WBC # BLD: 4.5 K/UL (ref 4.8–10.8)

## 2019-02-06 PROCEDURE — G8510 SCR DEP NEG, NO PLAN REQD: HCPCS | Performed by: FAMILY MEDICINE

## 2019-02-06 PROCEDURE — G8417 CALC BMI ABV UP PARAM F/U: HCPCS | Performed by: FAMILY MEDICINE

## 2019-02-06 PROCEDURE — G8427 DOCREV CUR MEDS BY ELIG CLIN: HCPCS | Performed by: FAMILY MEDICINE

## 2019-02-06 PROCEDURE — 1036F TOBACCO NON-USER: CPT | Performed by: FAMILY MEDICINE

## 2019-02-06 PROCEDURE — 99213 OFFICE O/P EST LOW 20 MIN: CPT | Performed by: FAMILY MEDICINE

## 2019-02-06 PROCEDURE — 3017F COLORECTAL CA SCREEN DOC REV: CPT | Performed by: FAMILY MEDICINE

## 2019-02-06 PROCEDURE — 1101F PT FALLS ASSESS-DOCD LE1/YR: CPT | Performed by: FAMILY MEDICINE

## 2019-02-06 PROCEDURE — G8484 FLU IMMUNIZE NO ADMIN: HCPCS | Performed by: FAMILY MEDICINE

## 2019-02-06 PROCEDURE — 1123F ACP DISCUSS/DSCN MKR DOCD: CPT | Performed by: FAMILY MEDICINE

## 2019-02-06 PROCEDURE — 4040F PNEUMOC VAC/ADMIN/RCVD: CPT | Performed by: FAMILY MEDICINE

## 2019-02-06 RX ORDER — IBUPROFEN 800 MG/1
TABLET ORAL
Qty: 90 TABLET | Refills: 3 | Status: SHIPPED | OUTPATIENT
Start: 2019-02-06

## 2019-02-06 ASSESSMENT — PATIENT HEALTH QUESTIONNAIRE - PHQ9
2. FEELING DOWN, DEPRESSED OR HOPELESS: 0
SUM OF ALL RESPONSES TO PHQ9 QUESTIONS 1 & 2: 0
SUM OF ALL RESPONSES TO PHQ QUESTIONS 1-9: 0
SUM OF ALL RESPONSES TO PHQ QUESTIONS 1-9: 0
1. LITTLE INTEREST OR PLEASURE IN DOING THINGS: 0

## 2019-02-06 ASSESSMENT — ENCOUNTER SYMPTOMS
DIARRHEA: 0
SHORTNESS OF BREATH: 0
SORE THROAT: 0
CONSTIPATION: 0
ABDOMINAL PAIN: 0
COUGH: 0
EYE ITCHING: 0
SINUS PRESSURE: 0
EYE DISCHARGE: 0

## 2019-02-25 ENCOUNTER — TELEPHONE (OUTPATIENT)
Dept: FAMILY MEDICINE CLINIC | Age: 69
End: 2019-02-25

## 2019-02-25 RX ORDER — LISINOPRIL 10 MG/1
TABLET ORAL
Qty: 90 TABLET | OUTPATIENT
Start: 2019-02-25

## 2019-03-05 ENCOUNTER — TELEPHONE (OUTPATIENT)
Dept: FAMILY MEDICINE CLINIC | Age: 69
End: 2019-03-05

## 2019-03-06 ENCOUNTER — TELEPHONE (OUTPATIENT)
Dept: ADMINISTRATIVE | Age: 69
End: 2019-03-06

## 2019-03-07 RX ORDER — LEVOTHYROXINE SODIUM 112 UG/1
112 TABLET ORAL DAILY
Qty: 30 TABLET | Refills: 3 | Status: SHIPPED | OUTPATIENT
Start: 2019-03-07

## 2019-06-05 DIAGNOSIS — N40.1 BENIGN PROSTATIC HYPERPLASIA WITH LOWER URINARY TRACT SYMPTOMS, SYMPTOM DETAILS UNSPECIFIED: ICD-10-CM

## 2019-06-05 LAB — PROSTATE SPECIFIC ANTIGEN: 2.23 NG/ML (ref 0–5.4)

## 2019-06-10 ENCOUNTER — OFFICE VISIT (OUTPATIENT)
Dept: UROLOGY | Age: 69
End: 2019-06-10
Payer: MEDICARE

## 2019-06-10 VITALS
HEIGHT: 72 IN | WEIGHT: 185 LBS | HEART RATE: 69 BPM | DIASTOLIC BLOOD PRESSURE: 64 MMHG | BODY MASS INDEX: 25.06 KG/M2 | SYSTOLIC BLOOD PRESSURE: 94 MMHG

## 2019-06-10 DIAGNOSIS — N40.1 BENIGN PROSTATIC HYPERPLASIA WITH LOWER URINARY TRACT SYMPTOMS, SYMPTOM DETAILS UNSPECIFIED: Primary | ICD-10-CM

## 2019-06-10 PROCEDURE — 1123F ACP DISCUSS/DSCN MKR DOCD: CPT | Performed by: UROLOGY

## 2019-06-10 PROCEDURE — 99214 OFFICE O/P EST MOD 30 MIN: CPT | Performed by: UROLOGY

## 2019-06-10 PROCEDURE — G8427 DOCREV CUR MEDS BY ELIG CLIN: HCPCS | Performed by: UROLOGY

## 2019-06-10 PROCEDURE — G8417 CALC BMI ABV UP PARAM F/U: HCPCS | Performed by: UROLOGY

## 2019-06-10 PROCEDURE — 1036F TOBACCO NON-USER: CPT | Performed by: UROLOGY

## 2019-06-10 PROCEDURE — 4040F PNEUMOC VAC/ADMIN/RCVD: CPT | Performed by: UROLOGY

## 2019-06-10 PROCEDURE — 3017F COLORECTAL CA SCREEN DOC REV: CPT | Performed by: UROLOGY

## 2019-06-10 RX ORDER — M-VIT,TX,IRON,MINS/CALC/FOLIC 27MG-0.4MG
1 TABLET ORAL DAILY
COMMUNITY

## 2019-06-10 RX ORDER — MAG HYDROX/ALUMINUM HYD/SIMETH 400-400-40
SUSPENSION, ORAL (FINAL DOSE FORM) ORAL
COMMUNITY
End: 2021-06-15

## 2019-06-10 RX ORDER — SILDENAFIL 100 MG/1
100 TABLET, FILM COATED ORAL DAILY PRN
Qty: 30 TABLET | Refills: 1 | Status: SHIPPED | OUTPATIENT
Start: 2019-06-10 | End: 2021-10-11 | Stop reason: SDUPTHER

## 2019-06-10 RX ORDER — TAMSULOSIN HYDROCHLORIDE 0.4 MG/1
0.4 CAPSULE ORAL DAILY
Qty: 90 CAPSULE | Refills: 3 | Status: SHIPPED | OUTPATIENT
Start: 2019-06-10 | End: 2020-06-09 | Stop reason: SDUPTHER

## 2019-06-10 RX ORDER — FINASTERIDE 5 MG/1
5 TABLET, FILM COATED ORAL DAILY
Qty: 90 TABLET | Refills: 3 | Status: SHIPPED | OUTPATIENT
Start: 2019-06-10 | End: 2020-06-09 | Stop reason: SDUPTHER

## 2019-06-10 NOTE — PROGRESS NOTES
[facility] EVALUATION NOTE                                                 H&P                                                                                                                                                 Reason for Visit  BPH with obstruction    History of Present Illness  16-year-old with history of urinary retention which is completely resolved with medical therapy using tamsulosin and finasteride  New complaint of erectile dysfunction    Urologic Review of Systems/Symptoms  Denies hematuria  Denies dysuria  Denies incontinence  Denies flank pain  Other Urologic: PSAs have been stable    Review of Systems  Head and neck: No issues/reviewed  Cardiac: No recent issues/reviewed  Pulmonary: No issues/reviewed  Gastrointestinal: No issues/reviewed  Neurologic: No recent issues/reviewed  Extremities: No issues/reviewed  Lymphatics: No lymphadenopathy no change  Genitourinary: See above  Skin: No issues/reviewed  Hospitalization: No recent hospitalization  Recently lost girlfriend of 18 years  All 14 categories of Review of Systems otherwise reviewed no other findings reported. Past Medical History:   Diagnosis Date    Borderline hypertension     BPH (benign prostatic hypertrophy)     Elevated PSA     Erectile dysfunction     HTN (hypertension) 1/19/2015    Hyperlipidemia     Hypothyroidism     Osteoarthritis     Rotator cuff syndrome of left shoulder 9/30/2013     Past Surgical History:   Procedure Laterality Date    COLONOSCOPY  05/13/2013    DR. Tianna Denny    CYST REMOVAL Left 04/2018    left side of neck    ROTATOR CUFF REPAIR      right     Social History     Socioeconomic History    Marital status: Single     Spouse name: None    Number of children: None    Years of education: None    Highest education level: None   Occupational History    None   Social Needs    Financial resource strain: None    Food insecurity:     Worry: None     Inability: None    Transportation needs:     Medical: None     Non-medical: None   Tobacco Use    Smoking status: Never Smoker    Smokeless tobacco: Never Used   Substance and Sexual Activity    Alcohol use:  Yes    Drug use: No    Sexual activity: Yes     Partners: Female   Lifestyle    Physical activity:     Days per week: None     Minutes per session: None    Stress: None   Relationships    Social connections:     Talks on phone: None     Gets together: None     Attends Latter day service: None     Active member of club or organization: None     Attends meetings of clubs or organizations: None     Relationship status: None    Intimate partner violence:     Fear of current or ex partner: None     Emotionally abused: None     Physically abused: None     Forced sexual activity: None   Other Topics Concern    None   Social History Narrative    None     Family History   Problem Relation Age of Onset    High Blood Pressure Mother      Current Outpatient Medications   Medication Sig Dispense Refill    Multiple Vitamins-Minerals (THERAPEUTIC MULTIVITAMIN-MINERALS) tablet Take 1 tablet by mouth daily      Selenium 200 MCG CAPS Take by mouth      Saw [place] 450 MG CAPS Take by mouth      tamsulosin (FLOMAX) 0.4 MG capsule Take 1 capsule by mouth daily 90 capsule 3    finasteride (PROSCAR) 5 MG tablet Take 1 tablet by mouth daily 90 tablet 3    sildenafil (VIAGRA) 100 MG tablet Take 1 tablet by mouth daily as needed for Erectile Dysfunction 30 tablet 1    levothyroxine (SYNTHROID) 112 MCG tablet Take 1 tablet by mouth daily 30 tablet 3    Multiple Vitamins-Minerals (OCUVITE ADULT 50+ PO) Take by mouth daily      Potassium 99 MG TABS Take by mouth daily      Turmeric 450 MG CAPS Take by mouth daily      Cranberry-Cholecalciferol 4200-500 MG-UNIT CAPS Take by mouth daily      Calcium Carb-Cholecalciferol (CALCIUM+D3 PO) Take by mouth 3 times daily Calcium 1200 mg,  D3 5000IU      ibuprofen (ADVIL;MOTRIN) 800 MG tablet TAKE 1 TABLET BY MOUTH EVERY 8 HOURS AS NEEDED FOR PAIN 90 tablet 3    magnesium (MAGNESIUM-OXIDE) 250 MG TABS tablet Take 250 mg by mouth daily      Ascorbic Acid (VITAMIN C) 500 MG CAPS Take 1,000 mg by mouth daily       Omega-3 Fatty Acids (OMEGA 3 PO) Take  by mouth. No current facility-administered medications for this visit. Bee venom  All reviewed and verified by Dr Hemal Fox on today's visit    PSA   Date Value Ref Range Status   06/05/2019 2.23 0.00 - 5.40 ng/mL Final   04/16/2018 2.09 0.00 - 5.40 ng/mL Final   05/24/2017 2.44 0.00 - 5.40 ng/mL Final   05/25/2016 2.00 0.00 - 5.40 ng/mL Final   05/26/2015 2.38 0.00 - 5.40 ng/mL Final     Diagnostic Psa   Date Value Ref Range Status   05/19/2014 2.03 0.00 - 5.40 ng/mL Final   09/30/2013 2.1 0.0 - 4.0 ng/mL Final   05/23/2013 2.6 0.0 - 4.0 ng/mL Final     No results found for this visit on 06/10/19. Physical Exam  Vitals:    06/10/19 0900   BP: 94/64   Pulse: 69   Weight: 185 lb (83.9 kg)   Height: 6' (1.829 m)     Constitutional: patient is oriented to person, place, and time. patient appears well-developed. Not in distress. Ears: Adequate hearing/no hearing loss  Head: Normocephalic. Atraumatic  Neck: Normal range of motion. Cardiovascular: Normal rate, BP reviewed. Normal rhythm  Pulmonary/Chest: Normal respiratory effort No shortness of breath  Abdominal: Not distended. No hernias  Urologic Exam  Neurologic exam unchanged. Normal rectal tone no rectal masses. Uneven prostate no change in exam  PSA stable at 2.23. Musculoskeletal: Normal range of motion. Ambulatory normal strength. Extremities: No edema  Neurological: Intact no deficits   Skin: Skin is warm and dry. No lesions. No ulcers or rashes   Psychiatric: Normal affect.   Assessment/Medical Necessity-Decision Making  BPH with obstruction history of retention resolved with medical therapy finasteride and tamsulosin  Erectile dysfunction new onset prescription for tadalafil given with instructions  Plan  Follow-up with PSA in 1 year  Greater than 50% of 30 minutes spent consulting patient face-to-face  Orders Placed This Encounter   Procedures    PSA, Diagnostic     Standing Status:   Future     Standing Expiration Date:   6/9/2020     Orders Placed This Encounter   Medications    tamsulosin (FLOMAX) 0.4 MG capsule     Sig: Take 1 capsule by mouth daily     Dispense:  90 capsule     Refill:  3    finasteride (PROSCAR) 5 MG tablet     Sig: Take 1 tablet by mouth daily     Dispense:  90 tablet     Refill:  3    sildenafil (VIAGRA) 100 MG tablet     Sig: Take 1 tablet by mouth daily as needed for Erectile Dysfunction     Dispense:  30 tablet     Refill:  1     Deanna Starr MD       Please note this report has been partially produced using speech recognition software  And may cause contain errors related to that system including grammar, punctuation and spelling as well as words and phrases that may seem inappropriate. If there are questions or concerns please feel free to contact me to clarify.

## 2020-06-03 DIAGNOSIS — N40.1 BENIGN PROSTATIC HYPERPLASIA WITH LOWER URINARY TRACT SYMPTOMS, SYMPTOM DETAILS UNSPECIFIED: ICD-10-CM

## 2020-06-03 LAB — PROSTATE SPECIFIC ANTIGEN: 2 NG/ML (ref 0–5.4)

## 2020-06-09 ENCOUNTER — VIRTUAL VISIT (OUTPATIENT)
Dept: UROLOGY | Age: 70
End: 2020-06-09
Payer: MEDICARE

## 2020-06-09 PROCEDURE — 99441 PR PHYS/QHP TELEPHONE EVALUATION 5-10 MIN: CPT | Performed by: UROLOGY

## 2020-06-09 RX ORDER — TAMSULOSIN HYDROCHLORIDE 0.4 MG/1
0.4 CAPSULE ORAL DAILY
Qty: 90 CAPSULE | Refills: 3 | Status: SHIPPED | OUTPATIENT
Start: 2020-06-09 | End: 2021-06-15 | Stop reason: SDUPTHER

## 2020-06-09 RX ORDER — FINASTERIDE 5 MG/1
5 TABLET, FILM COATED ORAL DAILY
Qty: 90 TABLET | Refills: 3 | Status: SHIPPED | OUTPATIENT
Start: 2020-06-09 | End: 2021-06-01

## 2021-06-01 RX ORDER — FINASTERIDE 5 MG/1
5 TABLET, FILM COATED ORAL DAILY
Qty: 90 TABLET | Refills: 3 | Status: SHIPPED | OUTPATIENT
Start: 2021-06-01 | End: 2022-05-11

## 2021-06-08 DIAGNOSIS — N40.1 BENIGN PROSTATIC HYPERPLASIA WITH LOWER URINARY TRACT SYMPTOMS, SYMPTOM DETAILS UNSPECIFIED: ICD-10-CM

## 2021-06-08 LAB — PROSTATE SPECIFIC ANTIGEN: 1.91 NG/ML (ref 0–6.22)

## 2021-06-15 ENCOUNTER — OFFICE VISIT (OUTPATIENT)
Dept: UROLOGY | Age: 71
End: 2021-06-15
Payer: MEDICARE

## 2021-06-15 VITALS
BODY MASS INDEX: 25.2 KG/M2 | WEIGHT: 180 LBS | SYSTOLIC BLOOD PRESSURE: 124 MMHG | HEART RATE: 105 BPM | DIASTOLIC BLOOD PRESSURE: 60 MMHG | HEIGHT: 71 IN

## 2021-06-15 DIAGNOSIS — N40.1 BENIGN PROSTATIC HYPERPLASIA WITH LOWER URINARY TRACT SYMPTOMS, SYMPTOM DETAILS UNSPECIFIED: Primary | ICD-10-CM

## 2021-06-15 PROCEDURE — G8427 DOCREV CUR MEDS BY ELIG CLIN: HCPCS | Performed by: UROLOGY

## 2021-06-15 PROCEDURE — 3017F COLORECTAL CA SCREEN DOC REV: CPT | Performed by: UROLOGY

## 2021-06-15 PROCEDURE — 1123F ACP DISCUSS/DSCN MKR DOCD: CPT | Performed by: UROLOGY

## 2021-06-15 PROCEDURE — 1036F TOBACCO NON-USER: CPT | Performed by: UROLOGY

## 2021-06-15 PROCEDURE — G8417 CALC BMI ABV UP PARAM F/U: HCPCS | Performed by: UROLOGY

## 2021-06-15 PROCEDURE — 4040F PNEUMOC VAC/ADMIN/RCVD: CPT | Performed by: UROLOGY

## 2021-06-15 PROCEDURE — 99213 OFFICE O/P EST LOW 20 MIN: CPT | Performed by: UROLOGY

## 2021-06-15 RX ORDER — TAMSULOSIN HYDROCHLORIDE 0.4 MG/1
0.4 CAPSULE ORAL DAILY
Qty: 90 CAPSULE | Refills: 3 | Status: SHIPPED | OUTPATIENT
Start: 2021-06-15

## 2021-06-15 NOTE — PROGRESS NOTES
[facility] EVALUATION NOTE                                                 H&P                                                                                                                                                 Reason for Visit  BPH with obstruction    History of Present Illness  62-year-old male with history of BPH with obstruction  Symptoms well controlled with tamsulosin and finasteride  PSA at 1.91  Denies hematuria, UTI, and no other issues  Uses sildenafil with good result      Urologic Review of Systems/Symptoms  Unchanged    Review of Systems  Hospitalization: None recent  All 14 categories of Review of Systems otherwise reviewed no other findings reported. Review of systems normal  Past Medical History:   Diagnosis Date    Borderline hypertension     BPH (benign prostatic hypertrophy)     Elevated PSA     Erectile dysfunction     HTN (hypertension) 1/19/2015    Hyperlipidemia     Hypothyroidism     Osteoarthritis     Rotator cuff syndrome of left shoulder 9/30/2013     Past Surgical History:   Procedure Laterality Date    COLONOSCOPY  05/13/2013    DR. Bela Gutierrez    CYST REMOVAL Left 04/2018    left side of neck    ROTATOR CUFF REPAIR      right     Social History     Socioeconomic History    Marital status: Single     Spouse name: None    Number of children: None    Years of education: None    Highest education level: None   Occupational History    None   Tobacco Use    Smoking status: Never Smoker    Smokeless tobacco: Never Used   Substance and Sexual Activity    Alcohol use:  Yes    Drug use: No    Sexual activity: Yes     Partners: Female   Other Topics Concern    None   Social History Narrative    None     Social Determinants of Health     Financial Resource Strain:     Difficulty of Paying Living Expenses:    Food Insecurity:     Worried About Running Out of Food in the Last Year:     [address] in the Last Year:    Transportation Needs:     Lack of Transportation (Medical):      Lack of Transportation (Non-Medical):    Physical Activity:     Days of Exercise per Week:     Minutes of Exercise per Session:    Stress:     Feeling of Stress :    Social Connections:     Frequency of Communication with Friends and Family:     Frequency of Social Gatherings with Friends and Family:     Attends Jewish Services:     Active Member of Clubs or Organizations:     Attends Club or Organization Meetings:     Marital Status:    Intimate Partner Violence:     Fear of Current or Ex-Partner:     Emotionally Abused:     Physically Abused:     Sexually Abused:      Family History   Problem Relation Age of Onset    High Blood Pressure Mother      Current Outpatient Medications   Medication Sig Dispense Refill    tamsulosin (FLOMAX) 0.4 MG capsule Take 1 capsule by mouth daily 90 capsule 3    finasteride (PROSCAR) 5 MG tablet Take 1 tablet by mouth daily 90 tablet 3    Multiple Vitamins-Minerals (THERAPEUTIC MULTIVITAMIN-MINERALS) tablet Take 1 tablet by mouth daily      Selenium 200 MCG CAPS Take by mouth      sildenafil (VIAGRA) 100 MG tablet Take 1 tablet by mouth daily as needed for Erectile Dysfunction 30 tablet 1    levothyroxine (SYNTHROID) 112 MCG tablet Take 1 tablet by mouth daily 30 tablet 3    Multiple Vitamins-Minerals (OCUVITE ADULT 50+ PO) Take by mouth daily      Potassium 99 MG TABS Take by mouth daily      Turmeric 450 MG CAPS Take by mouth daily      Cranberry-Cholecalciferol 4200-500 MG-UNIT CAPS Take by mouth daily      Calcium Carb-Cholecalciferol (CALCIUM+D3 PO) Take by mouth 3 times daily Calcium 1200 mg,  D3 5000IU      ibuprofen (ADVIL;MOTRIN) 800 MG tablet TAKE 1 TABLET BY MOUTH EVERY 8 HOURS AS NEEDED FOR PAIN 90 tablet 3    magnesium (MAGNESIUM-OXIDE) 250 MG TABS tablet Take 250 mg by mouth daily      Ascorbic Acid (VITAMIN C) 500 MG CAPS Take 1,000 mg by mouth daily       Omega-3 Fatty Acids (OMEGA 3 PO) Take  by mouth.         No current facility-administered medications for this visit. Bee venom  All reviewed and verified by Dr Alexx Ibarra on today's visit    PSA   Date Value Ref Range Status   06/08/2021 1.91 0.00 - 6.22 ng/mL Final   06/03/2020 2.00 0.00 - 5.40 ng/mL Final   06/05/2019 2.23 0.00 - 5.40 ng/mL Final   04/16/2018 2.09 0.00 - 5.40 ng/mL Final   05/24/2017 2.44 0.00 - 5.40 ng/mL Final     Diagnostic Psa   Date Value Ref Range Status   05/19/2014 2.03 0.00 - 5.40 ng/mL Final   09/30/2013 2.1 0.0 - 4.0 ng/mL Final   05/23/2013 2.6 0.0 - 4.0 ng/mL Final     No results found for this visit on 06/15/21. Physical Exam  Vitals:    06/15/21 0903   BP: 124/60   Pulse: 105   Weight: 180 lb (81.6 kg)   Height: 5' 11\" (1.803 m)     Constitutional: Not in distress. Cardiovascular: Normal rate, BP reviewed. Normal  Pulmonary/Chest: Normal respiratory effort not short of breath  Abdominal: Not distended. Urologic Exam  Urologic exam unchanged  20 g prostate with no nodules. Assessment/Medical Necessity-Decision Making  BPH with obstruction  Symptoms well controlled with finasteride and tamsulosin  PSA 1.91  Plan  Continue finasteride and tamsulosin  Patient has adequate amount of sildenafil  PSA 1 year with follow-up  Greater than 50% of 20 minutes spent consulting patient face-to-face  Orders Placed This Encounter   Procedures    PSA, Diagnostic     Standing Status:   Future     Standing Expiration Date:   6/15/2022     Orders Placed This Encounter   Medications    tamsulosin (FLOMAX) 0.4 MG capsule     Sig: Take 1 capsule by mouth daily     Dispense:  90 capsule     Refill:  3     Avila Smith MD       Please note this report has been partially produced using speech recognition software  And may cause contain errors related to that system including grammar, punctuation and spelling as well as words and phrases that may seem inappropriate.  If there are questions or concerns please feel free to contact me to clarify.

## 2021-10-11 RX ORDER — SILDENAFIL 100 MG/1
100 TABLET, FILM COATED ORAL DAILY PRN
Qty: 30 TABLET | Refills: 1 | Status: SHIPPED | OUTPATIENT
Start: 2021-10-11 | End: 2022-04-15

## 2021-10-11 NOTE — TELEPHONE ENCOUNTER
[address], [place]  Pt states that everything is fine, he just needs a refill on this medication.

## 2022-04-15 RX ORDER — SILDENAFIL 100 MG/1
100 TABLET, FILM COATED ORAL DAILY PRN
Qty: 30 TABLET | Refills: 1 | Status: SHIPPED | OUTPATIENT
Start: 2022-04-15

## 2022-05-11 RX ORDER — FINASTERIDE 5 MG/1
5 TABLET, FILM COATED ORAL DAILY
Qty: 90 TABLET | Refills: 3 | Status: SHIPPED | OUTPATIENT
Start: 2022-05-11

## 2022-05-24 DIAGNOSIS — N40.1 BENIGN PROSTATIC HYPERPLASIA WITH LOWER URINARY TRACT SYMPTOMS, SYMPTOM DETAILS UNSPECIFIED: ICD-10-CM

## 2022-05-24 LAB — PROSTATE SPECIFIC ANTIGEN: 1.95 NG/ML (ref 0–4)

## 2022-06-07 ENCOUNTER — OFFICE VISIT (OUTPATIENT)
Dept: UROLOGY | Age: 72
End: 2022-06-07
Payer: MEDICARE

## 2022-06-07 VITALS
HEART RATE: 97 BPM | WEIGHT: 181 LBS | OXYGEN SATURATION: 98 % | BODY MASS INDEX: 26.81 KG/M2 | SYSTOLIC BLOOD PRESSURE: 126 MMHG | DIASTOLIC BLOOD PRESSURE: 74 MMHG | HEIGHT: 69 IN

## 2022-06-07 DIAGNOSIS — N40.1 BENIGN PROSTATIC HYPERPLASIA WITH LOWER URINARY TRACT SYMPTOMS, SYMPTOM DETAILS UNSPECIFIED: Primary | ICD-10-CM

## 2022-06-07 PROCEDURE — 99213 OFFICE O/P EST LOW 20 MIN: CPT | Performed by: UROLOGY

## 2022-06-07 PROCEDURE — 3017F COLORECTAL CA SCREEN DOC REV: CPT | Performed by: UROLOGY

## 2022-06-07 PROCEDURE — G8427 DOCREV CUR MEDS BY ELIG CLIN: HCPCS | Performed by: UROLOGY

## 2022-06-07 PROCEDURE — G8417 CALC BMI ABV UP PARAM F/U: HCPCS | Performed by: UROLOGY

## 2022-06-07 PROCEDURE — 1123F ACP DISCUSS/DSCN MKR DOCD: CPT | Performed by: UROLOGY

## 2022-06-07 PROCEDURE — 1036F TOBACCO NON-USER: CPT | Performed by: UROLOGY

## 2022-06-07 RX ORDER — TAMSULOSIN HYDROCHLORIDE 0.4 MG/1
0.4 CAPSULE ORAL DAILY
Qty: 90 CAPSULE | Refills: 3 | Status: SHIPPED | OUTPATIENT
Start: 2022-06-07

## 2022-06-07 RX ORDER — FINASTERIDE 5 MG/1
5 TABLET, FILM COATED ORAL DAILY
Qty: 90 TABLET | Refills: 3 | Status: SHIPPED | OUTPATIENT
Start: 2022-06-07

## 2022-06-07 RX ORDER — SILDENAFIL 100 MG/1
100 TABLET, FILM COATED ORAL DAILY PRN
Qty: 30 TABLET | Refills: 1 | Status: SHIPPED | OUTPATIENT
Start: 2022-06-07

## 2022-06-07 RX ORDER — MULTIVITAMIN/IRON/FOLIC ACID 18MG-0.4MG
TABLET ORAL
COMMUNITY

## 2022-06-07 RX ORDER — ZINC GLUCONATE 50 MG
50 TABLET ORAL DAILY
COMMUNITY

## 2022-06-07 NOTE — PROGRESS NOTES
[facility] EVALUATION NOTE                                                 H&P                                                                                                                                                 Reason for Visit  BPH with obstruction    History of Present Illness  79-year-old male with history of BPH with obstruction was been on tamsulosin and finasteride for many years with good control of voiding symptoms  No major issues with voiding dysfunction  Sildenafil works good for ED      Urologic Review of Systems/Symptoms  No changes    Review of Systems  Hospitalization: None recent  All 14 categories of Review of Systems otherwise reviewed no other findings reported. No change in review of systems  Past Medical History:   Diagnosis Date    Borderline hypertension     BPH (benign prostatic hypertrophy)     Elevated PSA     Erectile dysfunction     HTN (hypertension) 1/19/2015    Hyperlipidemia     Hypothyroidism     Osteoarthritis     Rotator cuff syndrome of left shoulder 9/30/2013     Past Surgical History:   Procedure Laterality Date    COLONOSCOPY  05/13/2013    DR. Kandy Wolff    CYST REMOVAL Left 04/2018    left side of neck    ROTATOR CUFF REPAIR      right     Social History     Socioeconomic History    Marital status: Single     Spouse name: None    Number of children: None    Years of education: None    Highest education level: None   Occupational History    None   Tobacco Use    Smoking status: Never Smoker    Smokeless tobacco: Never Used   Substance and Sexual Activity    Alcohol use:  Yes    Drug use: No    Sexual activity: Yes     Partners: Female   Other Topics Concern    None   Social History Narrative    None     Social Determinants of Health     Financial Resource Strain:     Difficulty of Paying Living Expenses: Not on file   Food Insecurity:     Worried About Running Out of Food in the Last Year: Not on file    [address] in the Last Year: Not on file   Transportation Needs:     Lack of Transportation (Medical): Not on file    Lack of Transportation (Non-Medical):  Not on file   Physical Activity:     Days of Exercise per Week: Not on file    Minutes of Exercise per Session: Not on file   Stress:     Feeling of Stress : Not on file   Social Connections:     Frequency of Communication with Friends and Family: Not on file    Frequency of Social Gatherings with Friends and Family: Not on file    Attends Rastafari Services: Not on file    Active Member of [address] [place] [address] [employer] or Organizations: Not on file    Attends Club or Organization Meetings: Not on file    Marital Status: Not on file   Intimate Partner Violence:     Fear of Current or Ex-Partner: Not on file    Emotionally Abused: Not on file    Physically Abused: Not on file    Sexually Abused: Not on file   Housing Stability:     Unable to Pay for Housing in the Last Year: Not on file    Number of [place] in the Last Year: Not on file    Unstable Housing in the Last Year: Not on file     Family History   Problem Relation Age of Onset    High Blood Pressure Mother      Current Outpatient Medications   Medication Sig Dispense Refill    Glucosamine-Chondroitin 4467-4142 MG/30ML LIQD Take by mouth      zinc gluconate 50 MG tablet Take 50 mg by mouth daily      VITAMIN E PO Take by mouth      Folic Acid (FOLATE PO) Take by mouth      Pyridoxine HCl (VITAMIN B-6 PO) Take by mouth      tamsulosin (FLOMAX) 0.4 mg capsule Take 1 capsule by mouth daily 90 capsule 3    finasteride (PROSCAR) 5 MG tablet Take 1 tablet by mouth daily 90 tablet 3    sildenafil (VIAGRA) 100 MG tablet Take 1 tablet by mouth daily as needed for Erectile Dysfunction 30 tablet 1    finasteride (PROSCAR) 5 MG tablet Take 1 tablet by mouth daily 90 tablet 3    sildenafil (VIAGRA) 100 MG tablet Take 1 tablet by mouth daily as needed for Erectile Dysfunction 30 tablet 1    tamsulosin (FLOMAX) 0.4 MG capsule Take 1 capsule by mouth daily 90 capsule 3    Multiple Vitamins-Minerals (THERAPEUTIC MULTIVITAMIN-MINERALS) tablet Take 1 tablet by mouth daily      levothyroxine (SYNTHROID) 112 MCG tablet Take 1 tablet by mouth daily 30 tablet 3    Multiple Vitamins-Minerals (OCUVITE ADULT 50+ PO) Take by mouth daily      Potassium 99 MG TABS Take by mouth daily      Turmeric 450 MG CAPS Take by mouth daily      Cranberry-Cholecalciferol 4200-500 MG-UNIT CAPS Take by mouth daily      Calcium Carb-Cholecalciferol (CALCIUM+D3 PO) Take by mouth 3 times daily Calcium 1200 mg,  D3 5000IU      ibuprofen (ADVIL;MOTRIN) 800 MG tablet TAKE 1 TABLET BY MOUTH EVERY 8 HOURS AS NEEDED FOR PAIN 90 tablet 3    magnesium (MAGNESIUM-OXIDE) 250 MG TABS tablet Take 250 mg by mouth daily      Ascorbic Acid (VITAMIN C) 500 MG CAPS Take 1,000 mg by mouth daily       Omega-3 Fatty Acids (OMEGA 3 PO) Take  by mouth. No current facility-administered medications for this visit. Bee venom  All reviewed and verified by Dr Castorena Held on today's visit    PSA   Date Value Ref Range Status   05/24/2022 1.95 0.00 - 4.00 ng/mL Final   06/08/2021 1.91 0.00 - 6.22 ng/mL Final   06/03/2020 2.00 0.00 - 5.40 ng/mL Final   06/05/2019 2.23 0.00 - 5.40 ng/mL Final   04/16/2018 2.09 0.00 - 5.40 ng/mL Final     Diagnostic Psa   Date Value Ref Range Status   05/19/2014 2.03 0.00 - 5.40 ng/mL Final   09/30/2013 2.1 0.0 - 4.0 ng/mL Final   05/23/2013 2.6 0.0 - 4.0 ng/mL Final     No results found for this visit on 06/07/22. Physical Exam  Vitals:    06/07/22 0914   BP: 126/74   Pulse: 97   SpO2: 98%   Weight: 181 lb (82.1 kg)   Height: 5' 9\" (1.753 m)     Constitutional: Not in distress. Cardiovascular: Normal rate, BP reviewed. Normal blood pressure  Pulmonary/Chest: Normal respiratory effort not short of breath  Abdominal: Not distended. Urologic Exam  Normal rectal tone  40 g prostate with no nodules  PSA stable at 1.95.   Assessment/Medical Necessity-Decision Making  BPH with obstruction symptoms well controlled with tamsulosin and finasteride  Stable prostate exam  Stable PSA  Plan  PSA 1 year with follow-up  Greater than 50% of 20 minutes spent consulting patient face-to-face  Orders Placed This Encounter   Procedures    PSA, Diagnostic     Standing Status:   Future     Standing Expiration Date:   6/7/2023     Orders Placed This Encounter   Medications    tamsulosin (FLOMAX) 0.4 mg capsule     Sig: Take 1 capsule by mouth daily     Dispense:  90 capsule     Refill:  3    finasteride (PROSCAR) 5 MG tablet     Sig: Take 1 tablet by mouth daily     Dispense:  90 tablet     Refill:  3    sildenafil (VIAGRA) 100 MG tablet     Sig: Take 1 tablet by mouth daily as needed for Erectile Dysfunction     Dispense:  30 tablet     Refill:  1     Efrain Cook MD       Please note this report has been partially produced using speech recognition software  And may cause contain errors related to that system including grammar, punctuation and spelling as well as words and phrases that may seem inappropriate. If there are questions or concerns please feel free to contact me to clarify.

## 2023-02-21 LAB
THYROTROPIN (MIU/L) IN SER/PLAS BY DETECTION LIMIT <= 0.05 MIU/L: 2.94 MIU/L (ref 0.44–3.98)
THYROXINE (T4) FREE (NG/DL) IN SER/PLAS: 1.02 NG/DL (ref 0.61–1.12)

## 2023-04-18 DIAGNOSIS — E03.9 HYPOTHYROIDISM, UNSPECIFIED: ICD-10-CM

## 2023-04-18 RX ORDER — LEVOTHYROXINE SODIUM 100 UG/1
TABLET ORAL
Qty: 30 TABLET | Refills: 2 | Status: SHIPPED | OUTPATIENT
Start: 2023-04-18 | End: 2023-07-24

## 2023-04-30 DIAGNOSIS — M54.9 DORSALGIA, UNSPECIFIED: ICD-10-CM

## 2023-05-01 RX ORDER — IBUPROFEN 800 MG/1
TABLET ORAL
Qty: 90 TABLET | Refills: 1 | Status: SHIPPED | OUTPATIENT
Start: 2023-05-01 | End: 2023-06-21 | Stop reason: SDUPTHER

## 2023-05-15 DIAGNOSIS — N40.1 BENIGN PROSTATIC HYPERPLASIA WITH LOWER URINARY TRACT SYMPTOMS, SYMPTOM DETAILS UNSPECIFIED: Primary | ICD-10-CM

## 2023-05-15 RX ORDER — FINASTERIDE 5 MG/1
5 TABLET, FILM COATED ORAL DAILY
Qty: 90 TABLET | Refills: 3 | Status: SHIPPED | OUTPATIENT
Start: 2023-05-15

## 2023-05-24 DIAGNOSIS — N40.1 BENIGN PROSTATIC HYPERPLASIA WITH LOWER URINARY TRACT SYMPTOMS, SYMPTOM DETAILS UNSPECIFIED: ICD-10-CM

## 2023-05-24 LAB — PSA SERPL-MCNC: 1.95 NG/ML (ref 0–4)

## 2023-06-09 ENCOUNTER — OFFICE VISIT (OUTPATIENT)
Dept: UROLOGY | Age: 73
End: 2023-06-09
Payer: MEDICARE

## 2023-06-09 VITALS
HEIGHT: 69 IN | WEIGHT: 172 LBS | BODY MASS INDEX: 25.48 KG/M2 | DIASTOLIC BLOOD PRESSURE: 82 MMHG | SYSTOLIC BLOOD PRESSURE: 124 MMHG | HEART RATE: 81 BPM

## 2023-06-09 DIAGNOSIS — N40.0 BENIGN PROSTATIC HYPERPLASIA WITHOUT LOWER URINARY TRACT SYMPTOMS: Primary | ICD-10-CM

## 2023-06-09 DIAGNOSIS — Z12.5 SCREENING PSA (PROSTATE SPECIFIC ANTIGEN): ICD-10-CM

## 2023-06-09 PROCEDURE — G8419 CALC BMI OUT NRM PARAM NOF/U: HCPCS | Performed by: PHYSICIAN ASSISTANT

## 2023-06-09 PROCEDURE — 3079F DIAST BP 80-89 MM HG: CPT | Performed by: PHYSICIAN ASSISTANT

## 2023-06-09 PROCEDURE — G8427 DOCREV CUR MEDS BY ELIG CLIN: HCPCS | Performed by: PHYSICIAN ASSISTANT

## 2023-06-09 PROCEDURE — 3074F SYST BP LT 130 MM HG: CPT | Performed by: PHYSICIAN ASSISTANT

## 2023-06-09 PROCEDURE — 1123F ACP DISCUSS/DSCN MKR DOCD: CPT | Performed by: PHYSICIAN ASSISTANT

## 2023-06-09 PROCEDURE — 3017F COLORECTAL CA SCREEN DOC REV: CPT | Performed by: PHYSICIAN ASSISTANT

## 2023-06-09 PROCEDURE — 1036F TOBACCO NON-USER: CPT | Performed by: PHYSICIAN ASSISTANT

## 2023-06-09 PROCEDURE — 99203 OFFICE O/P NEW LOW 30 MIN: CPT | Performed by: PHYSICIAN ASSISTANT

## 2023-06-09 RX ORDER — TAMSULOSIN HYDROCHLORIDE 0.4 MG/1
0.4 CAPSULE ORAL DAILY
Qty: 90 CAPSULE | Refills: 3 | Status: SHIPPED | OUTPATIENT
Start: 2023-06-09 | End: 2024-06-03

## 2023-06-09 RX ORDER — SILDENAFIL 100 MG/1
100 TABLET, FILM COATED ORAL DAILY PRN
Qty: 30 TABLET | Refills: 5 | Status: SHIPPED | OUTPATIENT
Start: 2023-06-09 | End: 2023-12-06

## 2023-06-09 RX ORDER — LEVOTHYROXINE SODIUM 0.1 MG/1
100 TABLET ORAL DAILY
COMMUNITY
Start: 2023-05-17

## 2023-06-09 ASSESSMENT — ENCOUNTER SYMPTOMS
SINUS PRESSURE: 0
CHEST TIGHTNESS: 0
SHORTNESS OF BREATH: 0
DIARRHEA: 0
COUGH: 0
VOMITING: 0
BACK PAIN: 0
TROUBLE SWALLOWING: 0
ABDOMINAL PAIN: 0

## 2023-06-09 NOTE — PROGRESS NOTES
Chaperone for Intimate Exam    1. Was chaperone offered as part of the rooming process? offered, declined   2. If Chaperone is declined by patient, NA: chaperone was available and exam completed  3.  Chaperone is N/A
daily 90 capsule 3    sildenafil (VIAGRA) 100 MG tablet Take 1 tablet by mouth daily as needed for Erectile Dysfunction 30 tablet 1    Multiple Vitamins-Minerals (THERAPEUTIC MULTIVITAMIN-MINERALS) tablet Take 1 tablet by mouth daily      Multiple Vitamins-Minerals (OCUVITE ADULT 50+ PO) Take by mouth daily      Potassium 99 MG TABS Take by mouth daily      Turmeric 450 MG CAPS Take by mouth daily      Cranberry-Cholecalciferol 4200-500 MG-UNIT CAPS Take by mouth daily      Calcium Carb-Cholecalciferol (CALCIUM+D3 PO) Take by mouth 3 times daily Calcium 1200 mg,  D3 5000IU      ibuprofen (ADVIL;MOTRIN) 800 MG tablet TAKE 1 TABLET BY MOUTH EVERY 8 HOURS AS NEEDED FOR PAIN 90 tablet 3    magnesium (MAGNESIUM-OXIDE) 250 MG TABS tablet Take 1 tablet by mouth daily      Ascorbic Acid (VITAMIN C) 500 MG CAPS Take 1,000 mg by mouth daily       Omega-3 Fatty Acids (OMEGA 3 PO) Take  by mouth. No current facility-administered medications for this visit. Bee venom  reviewed      Review of Systems   Constitutional:  Negative for activity change, appetite change, chills, fever and unexpected weight change. HENT:  Negative for drooling, ear pain, nosebleeds, sinus pressure and trouble swallowing. Respiratory:  Negative for cough, chest tightness and shortness of breath. Cardiovascular:  Negative for chest pain and leg swelling. Gastrointestinal:  Negative for abdominal pain, diarrhea and vomiting. Endocrine: Negative for polydipsia and polyphagia. Genitourinary:  Negative for dysuria, flank pain and frequency. Musculoskeletal:  Negative for back pain and myalgias. Skin:  Negative for pallor and rash. Neurological:  Negative for syncope, weakness and headaches. Hematological:  Does not bruise/bleed easily. All other systems reviewed and are negative.       Objective:   Physical Exam  HENT:      Mouth/Throat:      Mouth: Mucous membranes are moist.   Pulmonary:      Effort: Pulmonary effort is

## 2023-06-20 PROBLEM — M79.641 PAIN IN BOTH HANDS: Status: ACTIVE | Noted: 2023-06-20

## 2023-06-20 PROBLEM — M79.642 PAIN IN BOTH HANDS: Status: ACTIVE | Noted: 2023-06-20

## 2023-06-20 PROBLEM — M54.9 BACK PAIN: Status: ACTIVE | Noted: 2023-06-20

## 2023-06-20 PROBLEM — L30.9 ECZEMA: Status: ACTIVE | Noted: 2023-06-20

## 2023-06-20 PROBLEM — N40.0 BPH (BENIGN PROSTATIC HYPERPLASIA): Status: ACTIVE | Noted: 2023-06-20

## 2023-06-20 PROBLEM — L29.9 ITCHY SKIN: Status: ACTIVE | Noted: 2023-06-20

## 2023-06-20 PROBLEM — E03.9 HYPOTHYROIDISM: Status: ACTIVE | Noted: 2023-06-20

## 2023-06-20 RX ORDER — CRISABOROLE 20 MG/G
OINTMENT TOPICAL
COMMUNITY
Start: 2021-01-11

## 2023-06-20 RX ORDER — TAMSULOSIN HYDROCHLORIDE 0.4 MG/1
0.4 CAPSULE ORAL DAILY
COMMUNITY

## 2023-06-20 RX ORDER — ZINC GLUCONATE 50 MG
1 TABLET ORAL ONCE
COMMUNITY

## 2023-06-20 RX ORDER — VITS A,C,E/LUTEIN/MINERALS 300MCG-200
1 TABLET ORAL DAILY
COMMUNITY

## 2023-06-20 RX ORDER — FINASTERIDE 5 MG/1
5 TABLET, FILM COATED ORAL DAILY
COMMUNITY

## 2023-06-20 RX ORDER — CALCIUM CARBONATE 500(1250)
TABLET ORAL
COMMUNITY

## 2023-06-20 RX ORDER — HYDROCORTISONE 25 MG/G
CREAM TOPICAL 2 TIMES DAILY
COMMUNITY
Start: 2021-01-19

## 2023-06-20 RX ORDER — PROPRANOLOL/HYDROCHLOROTHIAZID 40 MG-25MG
1 TABLET ORAL ONCE
COMMUNITY

## 2023-06-21 ENCOUNTER — OFFICE VISIT (OUTPATIENT)
Dept: PRIMARY CARE | Facility: CLINIC | Age: 73
End: 2023-06-21
Payer: MEDICARE

## 2023-06-21 VITALS
HEART RATE: 84 BPM | RESPIRATION RATE: 20 BRPM | TEMPERATURE: 97.4 F | SYSTOLIC BLOOD PRESSURE: 150 MMHG | OXYGEN SATURATION: 97 % | DIASTOLIC BLOOD PRESSURE: 74 MMHG | BODY MASS INDEX: 25.83 KG/M2 | WEIGHT: 180 LBS

## 2023-06-21 DIAGNOSIS — B35.1 NAIL FUNGUS: Primary | ICD-10-CM

## 2023-06-21 DIAGNOSIS — M54.9 DORSALGIA, UNSPECIFIED: ICD-10-CM

## 2023-06-21 DIAGNOSIS — E03.9 HYPOTHYROIDISM, UNSPECIFIED TYPE: ICD-10-CM

## 2023-06-21 DIAGNOSIS — G56.01 CARPAL TUNNEL SYNDROME OF RIGHT WRIST: ICD-10-CM

## 2023-06-21 DIAGNOSIS — E78.00 HYPERCHOLESTEROLEMIA: ICD-10-CM

## 2023-06-21 DIAGNOSIS — Z12.11 COLON CANCER SCREENING: ICD-10-CM

## 2023-06-21 DIAGNOSIS — N40.0 BENIGN PROSTATIC HYPERPLASIA, UNSPECIFIED WHETHER LOWER URINARY TRACT SYMPTOMS PRESENT: ICD-10-CM

## 2023-06-21 PROCEDURE — 3077F SYST BP >= 140 MM HG: CPT | Performed by: FAMILY MEDICINE

## 2023-06-21 PROCEDURE — 1170F FXNL STATUS ASSESSED: CPT | Performed by: FAMILY MEDICINE

## 2023-06-21 PROCEDURE — G0439 PPPS, SUBSEQ VISIT: HCPCS | Performed by: FAMILY MEDICINE

## 2023-06-21 PROCEDURE — 3078F DIAST BP <80 MM HG: CPT | Performed by: FAMILY MEDICINE

## 2023-06-21 PROCEDURE — 1159F MED LIST DOCD IN RCRD: CPT | Performed by: FAMILY MEDICINE

## 2023-06-21 PROCEDURE — 1036F TOBACCO NON-USER: CPT | Performed by: FAMILY MEDICINE

## 2023-06-21 RX ORDER — SILDENAFIL 100 MG/1
1 TABLET, FILM COATED ORAL AS NEEDED
COMMUNITY
Start: 2022-06-07 | End: 2023-12-06

## 2023-06-21 RX ORDER — IBUPROFEN 800 MG/1
TABLET ORAL
Qty: 90 TABLET | Refills: 1 | Status: SHIPPED | OUTPATIENT
Start: 2023-06-21 | End: 2023-11-14 | Stop reason: SDUPTHER

## 2023-06-21 RX ORDER — TERBINAFINE HYDROCHLORIDE 250 MG/1
250 TABLET ORAL DAILY
Qty: 30 TABLET | Refills: 3 | Status: SHIPPED | OUTPATIENT
Start: 2023-06-21 | End: 2023-11-08

## 2023-06-21 ASSESSMENT — ACTIVITIES OF DAILY LIVING (ADL)
DOING_HOUSEWORK: INDEPENDENT
BATHING: INDEPENDENT
GROCERY_SHOPPING: INDEPENDENT
DRESSING: INDEPENDENT
MANAGING_FINANCES: INDEPENDENT
TAKING_MEDICATION: INDEPENDENT

## 2023-06-21 ASSESSMENT — PATIENT HEALTH QUESTIONNAIRE - PHQ9
SUM OF ALL RESPONSES TO PHQ9 QUESTIONS 1 AND 2: 0
2. FEELING DOWN, DEPRESSED OR HOPELESS: NOT AT ALL
1. LITTLE INTEREST OR PLEASURE IN DOING THINGS: NOT AT ALL

## 2023-06-21 NOTE — PROGRESS NOTES
Subjective   Patient ID: Nic Valdes Jr. is a 73 y.o. male who presents for Medicare Annual Wellness Visit Subsequent, Hypertension, Hyperlipidemia, and Hypothyroidism.    HPI  Presents today for above reason    Last eye exam: 1 week ago  Last dental: 2/2023  Pt tries to follow low fat/sugar/salt diet  Exercises: BIKING AND HIKING  Denies SOB/chest pain/palpitations  Pt is not a smoker  No urinary or bowel issues per pt.  Sleeping well  Medications working well    Pt has toenail fungus on both grater toes.  Has been given medication for this in the past  It did work when using  Has numbness in Thumb, index and middle fingers of Right hand  Ongoing 1 1/2 years    Yearly BW ordered  Colon screening ordered    Review of Systems  Constitutional:  no chills, no fever and no night sweats.  Eyes: no blurred vision and no eyesight problems.  ENT: no hearing loss, no nasal congestion, no hoarseness and no sore throat.  Neck: no mass (es) and no swelling.  Cardiovascular: no chest pain, no intermittent leg claudication, no lower extremity edema, no palpitation and no syncope.  Respiratory: no cough, no shortness of breath during exertion, no shortness of breath at rest and no wheezing.  Gastrointestinal: no abdominal pain, no blood in stools, no constipation, no diarrhea, no melena, no nausea, no rectal pain and no vomiting.  Genitourinary: no dysuria, no change in urinary frequency, no urinary hesitancy and no feelings of urinary urgency.  Musculoskeletal: no arthralgias, no back pain and no myalgias.  Integumentary: no new skin lesions and no rashes.  Neurological: no difficulty walking, no headache, no limb weakness, no numbness and no tingling.  Psychiatric/Behavioral: no anxiety, no depression, no anhedonia and no substance use disorders.  Endocrine: no recent weight gain and no recent weight loss.  Hematologic/Lymphatic: no tendency for easy bruising and no swollen glands    Objective   Physical Exam  Patient here for  Medicare wellness exam doing well chronic problem stable has recurrent nail fungus is like Lamisil treatment.  Also due for colonoscopy.  Physical exam today's office visit constitutional alert and oriented x3.    Head is atraumatic HEENT is within normal limits.    Neck supple no masses full range of motion.    Thyroid is normal in size no thyromegaly there is no carotid bruits.    Pulmonary exam shows clear to auscultation no respiratory distress.    Cardiovascular shows no murmur rub or gallop.  Regular rate and rhythm.    Abdominal exam soft nontender no hepatosplenomegaly or masses normal bowel sounds no rebound no guarding.    Musculoskeletal exam no joint pain no muscle pain full range of motion.    Psych exam normal mood and affect.    Dermatologic exam no skin lesions no rash no blemishes.    Neuro exam is no focal deficits.  Normal exam.    Extremities no edema normal pulses normal capillary refill.    /74   Pulse 84   Temp 36.3 °C (97.4 °F)   Resp 20   Wt 81.6 kg (180 lb)   SpO2 97%   BMI 25.83 kg/m²     Lab Results   Component Value Date    WBC 5.4 07/12/2021    HGB 15.4 07/12/2021    HCT 49.1 07/12/2021    MCV 96 07/12/2021     07/12/2021       Assessment/Plan no new physical complaints plan continue current medication get blood drawn follow-up with the results.  Routine recheck 6 months  Problem List Items Addressed This Visit       BPH (benign prostatic hyperplasia)    Hypothyroidism     Other Visit Diagnoses       Hypercholesterolemia        Colon cancer screening        Dorsalgia, unspecified

## 2023-07-09 PROBLEM — Z12.5 SCREENING PSA (PROSTATE SPECIFIC ANTIGEN): Status: RESOLVED | Noted: 2023-06-09 | Resolved: 2023-07-09

## 2023-07-23 DIAGNOSIS — E03.9 HYPOTHYROIDISM, UNSPECIFIED: ICD-10-CM

## 2023-07-24 RX ORDER — LEVOTHYROXINE SODIUM 100 UG/1
100 TABLET ORAL DAILY
Qty: 30 TABLET | Refills: 2 | Status: SHIPPED | OUTPATIENT
Start: 2023-07-24 | End: 2023-09-18

## 2023-09-17 DIAGNOSIS — E03.9 HYPOTHYROIDISM, UNSPECIFIED: ICD-10-CM

## 2023-09-18 RX ORDER — LEVOTHYROXINE SODIUM 100 UG/1
100 TABLET ORAL DAILY
Qty: 30 TABLET | Refills: 2 | Status: SHIPPED | OUTPATIENT
Start: 2023-09-18 | End: 2023-11-14 | Stop reason: SDUPTHER

## 2023-11-01 ENCOUNTER — APPOINTMENT (OUTPATIENT)
Dept: PRIMARY CARE | Facility: CLINIC | Age: 73
End: 2023-11-01
Payer: MEDICARE

## 2023-11-08 DIAGNOSIS — B35.1 NAIL FUNGUS: ICD-10-CM

## 2023-11-08 RX ORDER — TERBINAFINE HYDROCHLORIDE 250 MG/1
250 TABLET ORAL DAILY
Qty: 30 TABLET | Refills: 0 | Status: SHIPPED | OUTPATIENT
Start: 2023-11-08 | End: 2023-11-14 | Stop reason: SDUPTHER

## 2023-11-14 ENCOUNTER — OFFICE VISIT (OUTPATIENT)
Dept: PRIMARY CARE | Facility: CLINIC | Age: 73
End: 2023-11-14
Payer: MEDICARE

## 2023-11-14 VITALS
BODY MASS INDEX: 26.23 KG/M2 | OXYGEN SATURATION: 96 % | TEMPERATURE: 97.1 F | WEIGHT: 182.8 LBS | RESPIRATION RATE: 18 BRPM | HEART RATE: 96 BPM

## 2023-11-14 DIAGNOSIS — B35.1 NAIL FUNGUS: ICD-10-CM

## 2023-11-14 DIAGNOSIS — E03.9 HYPOTHYROIDISM, UNSPECIFIED: ICD-10-CM

## 2023-11-14 DIAGNOSIS — G56.03 CARPAL TUNNEL SYNDROME ON BOTH SIDES: Primary | ICD-10-CM

## 2023-11-14 DIAGNOSIS — M54.9 DORSALGIA, UNSPECIFIED: ICD-10-CM

## 2023-11-14 PROCEDURE — 1036F TOBACCO NON-USER: CPT | Performed by: FAMILY MEDICINE

## 2023-11-14 PROCEDURE — 99213 OFFICE O/P EST LOW 20 MIN: CPT | Performed by: FAMILY MEDICINE

## 2023-11-14 PROCEDURE — 69209 REMOVE IMPACTED EAR WAX UNI: CPT | Performed by: FAMILY MEDICINE

## 2023-11-14 PROCEDURE — 3075F SYST BP GE 130 - 139MM HG: CPT | Performed by: FAMILY MEDICINE

## 2023-11-14 PROCEDURE — 1159F MED LIST DOCD IN RCRD: CPT | Performed by: FAMILY MEDICINE

## 2023-11-14 PROCEDURE — 3078F DIAST BP <80 MM HG: CPT | Performed by: FAMILY MEDICINE

## 2023-11-14 RX ORDER — LEVOTHYROXINE SODIUM 100 UG/1
100 TABLET ORAL DAILY
Qty: 90 TABLET | Refills: 1 | Status: SHIPPED | OUTPATIENT
Start: 2023-11-14 | End: 2024-05-14 | Stop reason: SDUPTHER

## 2023-11-14 RX ORDER — TERBINAFINE HYDROCHLORIDE 250 MG/1
250 TABLET ORAL DAILY
Qty: 90 TABLET | Refills: 1 | Status: SHIPPED | OUTPATIENT
Start: 2023-11-14

## 2023-11-14 RX ORDER — IBUPROFEN 800 MG/1
TABLET ORAL
Qty: 90 TABLET | Refills: 1 | Status: SHIPPED | OUTPATIENT
Start: 2023-11-14 | End: 2024-03-01

## 2023-11-14 NOTE — PROGRESS NOTES
Subjective   Patient ID: Nic Valdes Jr. is a 73 y.o. male who presents for Results.    HPI  Presents for above reason  Pt would like to discuss results from nerve conduction test    Pt would like RIGHT ear check today for cerumen    No other concerns      Review of Systems  Constitutional:  no chills, no fever and no night sweats.  Eyes: no blurred vision and no eyesight problems.  ENT: no hearing loss, no nasal congestion, no hoarseness and no sore throat.  Neck: no mass (es) and no swelling.  Cardiovascular: no chest pain, no intermittent leg claudication, no lower extremity edema, no palpitation and no syncope.  Respiratory: no cough, no shortness of breath during exertion, no shortness of breath at rest and no wheezing.  Gastrointestinal: no abdominal pain, no blood in stools, no constipation, no diarrhea, no melena, no nausea, no rectal pain and no vomiting.  Genitourinary: no dysuria, no change in urinary frequency, no urinary hesitancy and no feelings of urinary urgency.  Musculoskeletal: no arthralgias, no back pain and no myalgias.  Integumentary: no new skin lesions and no rashes.  Neurological: no difficulty walking, no headache, no limb weakness, no numbness and no tingling.  Psychiatric/Behavioral: no anxiety, no depression, no anhedonia and no substance use disorders.  Endocrine: no recent weight gain and no recent weight loss.  Hematologic/Lymphatic: no tendency for easy bruising and no swollen glands    Objective   Physical Exam  Patient here to review EMG nerve conduction study findings showing severe right-sided carpal tunnel syndrome and moderate left side.  Would recommend to be referred to orthopedic surgery for carpal tunnel surgery on the right side physical therapy splinting or possibly surgery also needed on the left side will await their evaluation.  Patient also complains of plugging and fullness in her right ear evaluation showed cerumen impaction this was flushed clear without  difficulty revealing normal TM.  Rest of exam benign.  Physical exam today's office visit constitutional alert and oriented x3.    Head is atraumatic HEENT is within normal limits.    Neck supple no masses full range of motion.    Thyroid is normal in size no thyromegaly there is no carotid bruits.    Pulmonary exam shows clear to auscultation no respiratory distress.    Cardiovascular shows no murmur rub or gallop.  Regular rate and rhythm.    Abdominal exam soft nontender no hepatosplenomegaly or masses normal bowel sounds no rebound no guarding.    Musculoskeletal exam no joint pain no muscle pain full range of motion.    Psych exam normal mood and affect.    Dermatologic exam no skin lesions no rash no blemishes.    Neuro exam is no focal deficits.  Normal exam.    Extremities no edema normal pulses normal capillary refill.    Pulse 96   Temp 36.2 °C (97.1 °F)   Resp 18   Wt 82.9 kg (182 lb 12.8 oz)   SpO2 96%   BMI 26.23 kg/m²     Lab Results   Component Value Date    WBC 5.4 07/12/2021    HGB 15.4 07/12/2021    HCT 49.1 07/12/2021    MCV 96 07/12/2021     07/12/2021       Assessment/Plan   Problem List Items Addressed This Visit    None  Visit Diagnoses       Dorsalgia, unspecified        Hypothyroidism, unspecified        Nail fungus

## 2023-11-20 ENCOUNTER — OFFICE VISIT (OUTPATIENT)
Dept: ORTHOPEDIC SURGERY | Facility: CLINIC | Age: 73
End: 2023-11-20
Payer: MEDICARE

## 2023-11-20 DIAGNOSIS — G56.03 CARPAL TUNNEL SYNDROME ON BOTH SIDES: ICD-10-CM

## 2023-11-20 PROBLEM — G56.01 CARPAL TUNNEL SYNDROME, RIGHT UPPER LIMB: Status: ACTIVE | Noted: 2023-11-20

## 2023-11-20 PROCEDURE — 99214 OFFICE O/P EST MOD 30 MIN: CPT | Mod: 57,PO | Performed by: STUDENT IN AN ORGANIZED HEALTH CARE EDUCATION/TRAINING PROGRAM

## 2023-11-20 PROCEDURE — 1036F TOBACCO NON-USER: CPT | Performed by: STUDENT IN AN ORGANIZED HEALTH CARE EDUCATION/TRAINING PROGRAM

## 2023-11-20 PROCEDURE — 1159F MED LIST DOCD IN RCRD: CPT | Performed by: STUDENT IN AN ORGANIZED HEALTH CARE EDUCATION/TRAINING PROGRAM

## 2023-11-20 PROCEDURE — 99204 OFFICE O/P NEW MOD 45 MIN: CPT | Performed by: STUDENT IN AN ORGANIZED HEALTH CARE EDUCATION/TRAINING PROGRAM

## 2023-11-20 NOTE — PROGRESS NOTES
History of Present Illness:  Presents with  bilateral R>L hand numbness. The symptoms have been present for months. The patient denies any inciting trauma. The numbness primarily involves the thumb, index finger, and long finger. There is minimal numbness in the small finger. The patient complains of intermittant, moderate hand pain which is worse at night. It causes frequent night awakenings. The patient presents due to worsening symptoms.     EMG with severe on right, moderate on left    Review of Systems   GENERAL: Negative for malaise, significant weight loss, fever  MUSCULOSKELETAL: see HPI  NEURO:  Negative    The patient's past medical history, family history, social history, and review of systems were reviewed. History is otherwise negative except as stated in the HPI.    Physical Examination:  The patient appears to be their stated age, is in no apparent distress, and is oriented x3. The patients mood and affect are appropriate. The patients gait is normal. The examination of the limb in question was performed in comparison to the contralateral limb.  On musculoskeletal examination, the patient has full elbow range of motion. There is no tenderness to palpation about the lateral epicondyle. Tinels at the cubital tunnel and elbow flexion/compression are negative for ulnar nerve symptoms. In regards to the wrist, there is no obvious deformity. Range of motion is full in flexion, extension, pronation, and supination. Strength is 5/5 in flexion and extension. There is no tenderness to palpation about the 1st dorsal compartment, the 1st CMC joint, or the TFCC. Tinels at the carpal tunnel and Durkins compression test are positive. The patient has subjective paresthesias in the median nerve distribution. Sensation and motor function are intact in the radial, and ulnar nerve distribution. There is no obvious thenar atrophy, and thenar strength is 4/5 on right,  5/5 on left. There is no intrinsic atrophy, and  intrinsic strength is 5/5. All fingers are without triggering and are without pain over the A1 pulley. The patient can make a full composite fist. The hand itself is warm and well perfused. The skin is intact throughout. The contralateral hand/wrist are normal to inspection, range of motion, stability, and strength.    Imaging:  EMG with severe right CTS, moderate L CTS    Assessment:  Patient with bilateral R>L carpal tunnel syndrome.     Plan:   Surgery. Based on the history and physical exam, I believe that the patient has carpal tunnel syndrome. Given the duration/severity of symptoms and the failure of non-operative treatment, the patient wants to consider carpal tunnel release.  The benefit of surgery would be to stop the progression of symptoms, with the hope that the symptoms would also resolve. The risks of surgery include, but are not limited to, infection, bleeding, nerve/vessel injury, pillar pain, continued symptoms, and anesthetic complications. The patient understands the risks/benefits and consented to the surgery. Prior to the procedure, I discussed EMG results, specifically the severe on the right and that he has significant muscle weakness on that side.  Discussed the goals of surgery would be to prevent progression, improve numbness but he may have residual weakness as this has been going on for a long time.. I have answered all questions regarding the surgery itself and the post-operative course.    Ange Chang MD  Orthopaedic Surgeon

## 2023-11-27 NOTE — DISCHARGE INSTRUCTIONS
HAND SURGERY  Post Operative Instructions   Ange Chang MD     Dressing  You have a bulky dressing on your hand.      You may remove the dressing from your hand…    - 5 days after your surgery    - Please cover the wound with a Band-Aid (NOT WATERPROOF TYPE) or gauze and change it daily.  Please leave wound open to air when at home. Do not use any ointments, peroxide, betadine, alcohol ect. on your wound.     - After your dressing is off, do not do any strenuous activities with your hand until seen by OT or Dr. Chang within 2 weeks postoperatively. Please call Dr. Chang's office if this is not already arranged.    If you experience persistent numbness, tingling or burning sensation in your hand, it may be due to a cast or surgical dressing that is too tight. Keep your hand elevated, and if this does not resolve the problem, call your doctor immediately.    If your dressing gets wet, contact your physician’s office.     Activity  If pain will allow, try to flex and extend the fingers on the operated hand.  The dressing and swelling may cause the fingers to be stiff and this is common.  If the fingers turn blue, purple, or remain numb after the block has worn off, call the office immediately.      Showering  You may shower as soon as you want after surgery. Please cover the dressing with a bag.    You may shower and get the wound wet after you remove your dressing.  You may allow the water to run over the incisions and pat the incision dry. DO NOT let the wound soak in a tub, pool, or dish water. If you are using a band aid to cover your incision after a shower, make sure the incision is completely dry.    Ice & Elevate  The use of ice on your arm/hand after surgery will help with both pain control and swelling.  Continue with icing your arm/hand for the first 48 hours after surgery.  Thereafter, use it on an as needed basis.    Elevate your hand above your heart level as much as possible for the first 48 hours,  even while walking.  This will keep the swelling to a minimum and prevent throbbing and pain. Elevation reduces swelling and minimizes pain. Less swelling is associated with a lower infection rate, fewer wound complications, less post-operative stiffness, and more rapid recovery of function. To keep the swelling down, your hand must be kept above the level of your heart.     One common mistake people make is they will put pillows or blankets under their elbow while sitting or laying down. Please always keep the hand above the elbow and move your fingers as much as possible! Swelling tends to collect in the lowest part of the body so if your hand is below the rest of your arm, your fingers and hand will swell and become stiff.    Pain Medication  You will be discharged from the surgery center to home with an oral pain medication (analgesic).  The majority of patients use antiinflammatories like Motrin or Tylenol with sufficient pain relief but the prescription is available for breakthrough pain not responsive to the antiinflammatory medications. Rest and elevation is still one of the most important factors for pain control.     DO NOT drink alcoholic beverages or smoke while taking the prescription pain medication. DO NOT drive a car or other vehicle or operate any machinery while under the influence of your medication.    It is important NOT TO WAIT until your pain is severe before taking your medication since the medications often take an hour or longer before you will begin to feel some relief. Take the medication as soon as you experience even mild pain the first night after surgery. Usually by the second or third day after surgery your upper extremity will begin to feel better and you will require much less pain medication.    **Prescription refills will only be addressed during normal business hours.  Narcotic refills will not be addressed after hours or on weekends as the physician on call does not have access  to your medical records.**    Side Effects: All pain medications can cause nausea, vomiting, and/or constipation. It is best to take pain medication with food. If these problems become significant, please contact our office. Have a phone number for your pharmacy available.    Diet  Eat light the day of surgery and resume normal diet tomorrow. Increase your fluid intake due to pain medications    Driving  It is not advisable to drive a vehicle while you are on pain medication, due to the possible side effects.  However, once you are off pain medication and you feel that you are able to safely control the vehicle, you may drive.    Warning Signs  Fever: A low-grade fever (less than 101 degrees) following surgery and lasting for several days is quite common. You may even have some slight chills and sweating. If you have a low-grade fever you should make an effort to cough and breathe deeply to clear congestion from your lungs.     After hospital discharge, call your physician if you experience:  A temperature over 101 degrees (38.6 C)   Increasing pain (despite adequate elevation and maximal oral pain medication)   Increasing or foul smelling drainage (after the first 24 hours)     If any of the above occurs, please notify Dr. Chang's office.      Follow-up Appointments    We are interested in your prompt and complete recovery from surgery. If you have any problems or questions concerning your recovery, please call your doctor’s office.  Your doctor’s office can be called Monday --Friday, 8:00 am to 5:00 pm.     You should already have an appointment to see Dr. Chang within the next few weeks.  If you do not have this appointment, or need to change your appointment time, please contact our office.     Do not hesitate to call with any questions or concerns.      Dr. Chang's office numbers are:    1) During normal business hours, 8AM to 5PM Monday through Friday, please call: [phone]  2) After hours questions  can be directed to the physician on call at [phone]

## 2023-11-30 ENCOUNTER — HOSPITAL ENCOUNTER (OUTPATIENT)
Facility: HOSPITAL | Age: 73
Setting detail: OUTPATIENT SURGERY
Discharge: HOME | End: 2023-11-30
Attending: STUDENT IN AN ORGANIZED HEALTH CARE EDUCATION/TRAINING PROGRAM | Admitting: STUDENT IN AN ORGANIZED HEALTH CARE EDUCATION/TRAINING PROGRAM
Payer: MEDICARE

## 2023-11-30 VITALS
BODY MASS INDEX: 25.19 KG/M2 | WEIGHT: 175.93 LBS | HEART RATE: 77 BPM | RESPIRATION RATE: 18 BRPM | SYSTOLIC BLOOD PRESSURE: 155 MMHG | HEIGHT: 70 IN | TEMPERATURE: 97.9 F | OXYGEN SATURATION: 98 % | DIASTOLIC BLOOD PRESSURE: 85 MMHG

## 2023-11-30 DIAGNOSIS — G56.01 CARPAL TUNNEL SYNDROME, RIGHT UPPER LIMB: Primary | ICD-10-CM

## 2023-11-30 PROCEDURE — 64721 CARPAL TUNNEL SURGERY: CPT | Performed by: STUDENT IN AN ORGANIZED HEALTH CARE EDUCATION/TRAINING PROGRAM

## 2023-11-30 PROCEDURE — A4217 STERILE WATER/SALINE, 500 ML: HCPCS | Performed by: STUDENT IN AN ORGANIZED HEALTH CARE EDUCATION/TRAINING PROGRAM

## 2023-11-30 PROCEDURE — 7100000009 HC PHASE TWO TIME - INITIAL BASE CHARGE: Performed by: STUDENT IN AN ORGANIZED HEALTH CARE EDUCATION/TRAINING PROGRAM

## 2023-11-30 PROCEDURE — 2500000004 HC RX 250 GENERAL PHARMACY W/ HCPCS (ALT 636 FOR OP/ED): Performed by: STUDENT IN AN ORGANIZED HEALTH CARE EDUCATION/TRAINING PROGRAM

## 2023-11-30 PROCEDURE — 7100000010 HC PHASE TWO TIME - EACH INCREMENTAL 1 MINUTE: Performed by: STUDENT IN AN ORGANIZED HEALTH CARE EDUCATION/TRAINING PROGRAM

## 2023-11-30 PROCEDURE — 3600000008 HC OR TIME - EACH INCREMENTAL 1 MINUTE - PROCEDURE LEVEL THREE: Performed by: STUDENT IN AN ORGANIZED HEALTH CARE EDUCATION/TRAINING PROGRAM

## 2023-11-30 PROCEDURE — 3600000003 HC OR TIME - INITIAL BASE CHARGE - PROCEDURE LEVEL THREE: Performed by: STUDENT IN AN ORGANIZED HEALTH CARE EDUCATION/TRAINING PROGRAM

## 2023-11-30 PROCEDURE — 2500000005 HC RX 250 GENERAL PHARMACY W/O HCPCS: Performed by: STUDENT IN AN ORGANIZED HEALTH CARE EDUCATION/TRAINING PROGRAM

## 2023-11-30 RX ORDER — TRAMADOL HYDROCHLORIDE 50 MG/1
50 TABLET ORAL EVERY 8 HOURS PRN
Qty: 5 TABLET | Refills: 0 | Status: SHIPPED | OUTPATIENT
Start: 2023-11-30 | End: 2023-12-03

## 2023-11-30 RX ORDER — LIDOCAINE HYDROCHLORIDE AND EPINEPHRINE 10; 10 MG/ML; UG/ML
20 INJECTION, SOLUTION INFILTRATION; PERINEURAL ONCE
Status: COMPLETED | OUTPATIENT
Start: 2023-11-30 | End: 2023-11-30

## 2023-11-30 RX ORDER — SODIUM CHLORIDE 0.9 G/100ML
IRRIGANT IRRIGATION AS NEEDED
Status: DISCONTINUED | OUTPATIENT
Start: 2023-11-30 | End: 2023-11-30 | Stop reason: HOSPADM

## 2023-11-30 RX ADMIN — LIDOCAINE HYDROCHLORIDE,EPINEPHRINE BITARTRATE 20 ML: 10; .01 INJECTION, SOLUTION INFILTRATION; PERINEURAL at 08:58

## 2023-11-30 ASSESSMENT — PAIN - FUNCTIONAL ASSESSMENT
PAIN_FUNCTIONAL_ASSESSMENT: 0-10
PAIN_FUNCTIONAL_ASSESSMENT: 0-10

## 2023-11-30 ASSESSMENT — PAIN SCALES - GENERAL
PAINLEVEL_OUTOF10: 0 - NO PAIN
PAINLEVEL_OUTOF10: 0 - NO PAIN

## 2023-11-30 ASSESSMENT — COLUMBIA-SUICIDE SEVERITY RATING SCALE - C-SSRS
1. IN THE PAST MONTH, HAVE YOU WISHED YOU WERE DEAD OR WISHED YOU COULD GO TO SLEEP AND NOT WAKE UP?: NO
6. HAVE YOU EVER DONE ANYTHING, STARTED TO DO ANYTHING, OR PREPARED TO DO ANYTHING TO END YOUR LIFE?: NO
2. HAVE YOU ACTUALLY HAD ANY THOUGHTS OF KILLING YOURSELF?: NO

## 2023-11-30 NOTE — OP NOTE
CARPAL TUNNEL RELEASE- LOCAL  PREOPERATIVE DIAGNOSIS:   RIGHT Carpal Tunnel Syndrome  POSTOPERATIVE DIAGNOSIS: RIGHT Carpal Tunnel Syndrome  PROCEDURE:                              RIGHT Open Carpal Tunnel Release (26934)    SURGEON:                                    GARFIELD DESAI MD    ANESTHESIA:                              Local.    COMPLICATIONS:                     None.    Patient Name: Nic Valdes Jr.  MRN: [id number]  Time: 10:14 AM  11/30/2023    Estimated Blood Loss: 0ml    INDICATIONS FOR SURGERY:    The patient has been treated for on-going carpal tunnel syndrome that has failed non-operative treatment. Surgical release was offered. Potential benefits include relief of paraesthesias and preventing progression of nerve dysfunction. Risks include bleeding, infection, nerve injury, tendon injury, persistent numbness & paraesthesias, and pillar pain. The patient understood and consented willingly to surgery. The patient also understood the recovery and rehabilitation process.    DESCRIPTION OF PROCEDURE:  The patient was brought to the operating room and identified by the surgical staff. The operative limb was also confirmed by the surgical staff. The operative site in the hand was injected with a local anesthetic for colleen-operative pain control. Next, the median nerve in the wrist was injected with a local anesthestic for post-operative pain control. There was no anesthesiology staff present nor any administration of anesthetics through an IV during the case.  The operative limb was then prepped and draped in standard sterile fashion. A 2cm longitudinal incision was placed in the inter-thenar valley of the hand in line with the third webspace. Diligent hemostasis was maintained with bipolar cautery. The superficial palmar fascia was cut in line with the skin incision. The transverse carpal ligament was identified. Once satisfied that the recurrent motor branch was not traversing the ligament it was  released completely distally to the sentinel pad of fat and proximally past the wrist crease into the forearm. Once satisfied that the carpal tunnel and median nerve was completely decompressed the wound was washed. The incision was closed. A soft dressing was applied. The patient was taken to the recovery in stable condition.  I was present for the entire length of the case.     Ange Sweeney MD

## 2023-12-15 ENCOUNTER — OFFICE VISIT (OUTPATIENT)
Dept: ORTHOPEDIC SURGERY | Facility: CLINIC | Age: 73
End: 2023-12-15
Payer: MEDICARE

## 2023-12-15 DIAGNOSIS — G56.03 CARPAL TUNNEL SYNDROME ON BOTH SIDES: Primary | ICD-10-CM

## 2023-12-15 PROCEDURE — 99024 POSTOP FOLLOW-UP VISIT: CPT | Performed by: STUDENT IN AN ORGANIZED HEALTH CARE EDUCATION/TRAINING PROGRAM

## 2023-12-15 PROCEDURE — 1126F AMNT PAIN NOTED NONE PRSNT: CPT | Performed by: STUDENT IN AN ORGANIZED HEALTH CARE EDUCATION/TRAINING PROGRAM

## 2023-12-15 PROCEDURE — 1159F MED LIST DOCD IN RCRD: CPT | Performed by: STUDENT IN AN ORGANIZED HEALTH CARE EDUCATION/TRAINING PROGRAM

## 2023-12-15 PROCEDURE — 1036F TOBACCO NON-USER: CPT | Performed by: STUDENT IN AN ORGANIZED HEALTH CARE EDUCATION/TRAINING PROGRAM

## 2023-12-15 NOTE — PROGRESS NOTES
2 weeks S/p right carpal tunnel release. Doing well.  Significant improvement in the thumb.  Residual numbness to the tip of the long.    Physical Examination:  The patient appears to be their stated age, is in no apparent distress, and is oriented x3. The patients mood and affect are appropriate. The patients gait is normal. The examination of the limb in question was performed in comparison to the contralateral limb.    Incision c/d/I  AIN, PIN, ulnar intact  SILT A/R/U/M  Hand wwp    Assessment:  2 weeks post op    Plan:   The patient will progress to weightbearing to tolerance with theupper extremity.  We reviewed range of motion recovery exercises to the digits and wrist, scar massage and desensitization techniques.  The patient will work on these on her own with home exercise program.  Follow up with our office on an as-needed basis.  We did offer a formal therapy referral.  They declined in favor of home exercise program.   The patient verbalized agreement and understanding of plan for care.  All questions were answered at today's visit.          Ange Chang MD

## 2024-03-01 DIAGNOSIS — M54.9 DORSALGIA, UNSPECIFIED: ICD-10-CM

## 2024-03-01 RX ORDER — IBUPROFEN 800 MG/1
TABLET ORAL
Qty: 90 TABLET | Refills: 1 | Status: SHIPPED | OUTPATIENT
Start: 2024-03-01 | End: 2024-05-14 | Stop reason: SDUPTHER

## 2024-05-14 ENCOUNTER — LAB (OUTPATIENT)
Dept: LAB | Facility: LAB | Age: 74
End: 2024-05-14
Payer: MEDICARE

## 2024-05-14 ENCOUNTER — OFFICE VISIT (OUTPATIENT)
Dept: PRIMARY CARE | Facility: CLINIC | Age: 74
End: 2024-05-14
Payer: MEDICARE

## 2024-05-14 VITALS
BODY MASS INDEX: 26.54 KG/M2 | RESPIRATION RATE: 16 BRPM | DIASTOLIC BLOOD PRESSURE: 78 MMHG | HEIGHT: 70 IN | WEIGHT: 185.4 LBS | TEMPERATURE: 96.7 F | HEART RATE: 58 BPM | OXYGEN SATURATION: 96 % | SYSTOLIC BLOOD PRESSURE: 120 MMHG

## 2024-05-14 DIAGNOSIS — E78.5 HYPERLIPIDEMIA, UNSPECIFIED HYPERLIPIDEMIA TYPE: ICD-10-CM

## 2024-05-14 DIAGNOSIS — M54.9 DORSALGIA, UNSPECIFIED: ICD-10-CM

## 2024-05-14 DIAGNOSIS — E03.9 HYPOTHYROIDISM, UNSPECIFIED: ICD-10-CM

## 2024-05-14 DIAGNOSIS — N40.0 BENIGN PROSTATIC HYPERPLASIA, UNSPECIFIED WHETHER LOWER URINARY TRACT SYMPTOMS PRESENT: ICD-10-CM

## 2024-05-14 DIAGNOSIS — E03.9 HYPOTHYROIDISM, UNSPECIFIED TYPE: ICD-10-CM

## 2024-05-14 DIAGNOSIS — M19.90 OSTEOARTHRITIS, UNSPECIFIED OSTEOARTHRITIS TYPE, UNSPECIFIED SITE: ICD-10-CM

## 2024-05-14 DIAGNOSIS — I10 ESSENTIAL HYPERTENSION: ICD-10-CM

## 2024-05-14 DIAGNOSIS — E78.00 HYPERCHOLESTEROLEMIA: ICD-10-CM

## 2024-05-14 LAB
ALBUMIN SERPL BCP-MCNC: 4.5 G/DL (ref 3.4–5)
ALP SERPL-CCNC: 43 U/L (ref 33–136)
ALT SERPL W P-5'-P-CCNC: 14 U/L (ref 10–52)
ANION GAP SERPL CALC-SCNC: 10 MMOL/L (ref 10–20)
AST SERPL W P-5'-P-CCNC: 14 U/L (ref 9–39)
BASOPHILS # BLD AUTO: 0.05 X10*3/UL (ref 0–0.1)
BASOPHILS NFR BLD AUTO: 1 %
BILIRUB SERPL-MCNC: 0.7 MG/DL (ref 0–1.2)
BUN SERPL-MCNC: 22 MG/DL (ref 6–23)
CALCIUM SERPL-MCNC: 9.1 MG/DL (ref 8.6–10.3)
CHLORIDE SERPL-SCNC: 109 MMOL/L (ref 98–107)
CHOLEST SERPL-MCNC: 183 MG/DL (ref 0–199)
CHOLESTEROL/HDL RATIO: 4.3
CO2 SERPL-SCNC: 27 MMOL/L (ref 21–32)
CREAT SERPL-MCNC: 1.16 MG/DL (ref 0.5–1.3)
EGFRCR SERPLBLD CKD-EPI 2021: 67 ML/MIN/1.73M*2
EOSINOPHIL # BLD AUTO: 0.17 X10*3/UL (ref 0–0.4)
EOSINOPHIL NFR BLD AUTO: 3.3 %
ERYTHROCYTE [DISTWIDTH] IN BLOOD BY AUTOMATED COUNT: 13 % (ref 11.5–14.5)
GLUCOSE SERPL-MCNC: 99 MG/DL (ref 74–99)
HCT VFR BLD AUTO: 45.1 % (ref 41–52)
HDLC SERPL-MCNC: 42.2 MG/DL
HGB BLD-MCNC: 14.7 G/DL (ref 13.5–17.5)
IMM GRANULOCYTES # BLD AUTO: 0.01 X10*3/UL (ref 0–0.5)
IMM GRANULOCYTES NFR BLD AUTO: 0.2 % (ref 0–0.9)
LDLC SERPL CALC-MCNC: 126 MG/DL
LYMPHOCYTES # BLD AUTO: 1.23 X10*3/UL (ref 0.8–3)
LYMPHOCYTES NFR BLD AUTO: 24 %
MCH RBC QN AUTO: 30.7 PG (ref 26–34)
MCHC RBC AUTO-ENTMCNC: 32.6 G/DL (ref 32–36)
MCV RBC AUTO: 94 FL (ref 80–100)
MONOCYTES # BLD AUTO: 0.42 X10*3/UL (ref 0.05–0.8)
MONOCYTES NFR BLD AUTO: 8.2 %
NEUTROPHILS # BLD AUTO: 3.25 X10*3/UL (ref 1.6–5.5)
NEUTROPHILS NFR BLD AUTO: 63.3 %
NON HDL CHOLESTEROL: 141 MG/DL (ref 0–149)
NRBC BLD-RTO: 0 /100 WBCS (ref 0–0)
PLATELET # BLD AUTO: 191 X10*3/UL (ref 150–450)
POTASSIUM SERPL-SCNC: 4.4 MMOL/L (ref 3.5–5.3)
PROT SERPL-MCNC: 6.4 G/DL (ref 6.4–8.2)
RBC # BLD AUTO: 4.79 X10*6/UL (ref 4.5–5.9)
SODIUM SERPL-SCNC: 142 MMOL/L (ref 136–145)
T4 FREE SERPL-MCNC: 0.98 NG/DL (ref 0.61–1.12)
TRIGL SERPL-MCNC: 74 MG/DL (ref 0–149)
VLDL: 15 MG/DL (ref 0–40)
WBC # BLD AUTO: 5.1 X10*3/UL (ref 4.4–11.3)

## 2024-05-14 PROCEDURE — 36415 COLL VENOUS BLD VENIPUNCTURE: CPT

## 2024-05-14 PROCEDURE — 80053 COMPREHEN METABOLIC PANEL: CPT

## 2024-05-14 PROCEDURE — 3074F SYST BP LT 130 MM HG: CPT | Performed by: FAMILY MEDICINE

## 2024-05-14 PROCEDURE — 1157F ADVNC CARE PLAN IN RCRD: CPT | Performed by: FAMILY MEDICINE

## 2024-05-14 PROCEDURE — 3078F DIAST BP <80 MM HG: CPT | Performed by: FAMILY MEDICINE

## 2024-05-14 PROCEDURE — 84154 ASSAY OF PSA FREE: CPT

## 2024-05-14 PROCEDURE — 99213 OFFICE O/P EST LOW 20 MIN: CPT | Performed by: FAMILY MEDICINE

## 2024-05-14 PROCEDURE — 84439 ASSAY OF FREE THYROXINE: CPT

## 2024-05-14 PROCEDURE — 3008F BODY MASS INDEX DOCD: CPT | Performed by: FAMILY MEDICINE

## 2024-05-14 PROCEDURE — 1036F TOBACCO NON-USER: CPT | Performed by: FAMILY MEDICINE

## 2024-05-14 PROCEDURE — 85025 COMPLETE CBC W/AUTO DIFF WBC: CPT

## 2024-05-14 PROCEDURE — 84153 ASSAY OF PSA TOTAL: CPT

## 2024-05-14 PROCEDURE — 80061 LIPID PANEL: CPT

## 2024-05-14 RX ORDER — IBUPROFEN 800 MG/1
TABLET ORAL
Qty: 90 TABLET | Refills: 3 | Status: SHIPPED | OUTPATIENT
Start: 2024-05-14

## 2024-05-14 RX ORDER — LEVOTHYROXINE SODIUM 100 UG/1
100 TABLET ORAL DAILY
Qty: 90 TABLET | Refills: 1 | Status: SHIPPED | OUTPATIENT
Start: 2024-05-14

## 2024-05-14 NOTE — PROGRESS NOTES
Subjective   Patient ID: Nic Valdes Jr. is a 74 y.o. male who presents for No chief complaint on file..  HPI  Hypothyroidism  Taking Synthroid 100 mcg  Taking on an empty stomach  No significant weight loss/gain  No heat/cold intolerances  No increase in hair loss/dry skin      Taking current medications which were reviewed.  Problem list discussed.    Overall doing well.  Eating okay.  Staying active.    Has no other new problem /question.     Review of Systems  Constitutional:  no chills, no fever and no night sweats.  Eyes: no blurred vision and no eyesight problems.  ENT: no hearing loss, no nasal congestion, no hoarseness and no sore throat.  Neck: no mass (es) and no swelling.  Cardiovascular: no chest pain, no intermittent leg claudication, no lower extremity edema, no palpitation and no syncope.  Respiratory: no cough, no shortness of breath during exertion, no shortness of breath at rest and no wheezing.  Gastrointestinal: no abdominal pain, no blood in stools, no constipation, no diarrhea, no melena, no nausea, no rectal pain and no vomiting.  Genitourinary: no dysuria, no change in urinary frequency, no urinary hesitancy and no feelings of urinary urgency.  Musculoskeletal: no arthralgias, no back pain and no myalgias.  Integumentary: no new skin lesions and no rashes.  Neurological: no difficulty walking, no headache, no limb weakness, no numbness and no tingling.  Psychiatric/Behavioral: no anxiety, no depression, no anhedonia and no substance use disorders.  Endocrine: no recent weight gain and no recent weight loss.  Hematologic/Lymphatic: no tendency for easy bruising and no swollen glands    Objective   Physical Exam  Patient in for routine follow-up doing well history of hypothyroidism mild arthritis.  BPH all stable at present.  Thin muscular male in no acute distress physical exam today's office visit constitutional alert and oriented x3.    Head is atraumatic HEENT is within normal limits.     Neck supple no masses full range of motion.    Thyroid is normal in size no thyromegaly there is no carotid bruits.    Pulmonary exam shows clear to auscultation no respiratory distress.    Cardiovascular shows no murmur rub or gallop.  Regular rate and rhythm.    Abdominal exam soft nontender no hepatosplenomegaly or masses normal bowel sounds no rebound no guarding.    Musculoskeletal exam no joint pain no muscle pain full range of motion.    Psych exam normal mood and affect.    Dermatologic exam no skin lesions no rash no blemishes.    Neuro exam is no focal deficits.  Normal exam.    Extremities no edema normal pulses normal capillary refill.  Patient denies chest pain or shortness of breath with exertion.  Chronic problem stable.  There were no vitals taken for this visit.    Lab Results   Component Value Date    WBC 5.4 07/12/2021    HGB 15.4 07/12/2021    HCT 49.1 07/12/2021    MCV 96 07/12/2021     07/12/2021       Assessment/Plan plan continue current treatment course refill medication get blood drawn follow-up will have the results of doing well routine recheck 6 months  Problem List Items Addressed This Visit       BPH (benign prostatic hyperplasia)    Hypothyroidism    Essential hypertension    Hyperlipidemia    Osteoarthritis

## 2024-05-16 ENCOUNTER — TELEPHONE (OUTPATIENT)
Dept: PRIMARY CARE | Facility: CLINIC | Age: 74
End: 2024-05-16
Payer: COMMERCIAL

## 2024-05-16 NOTE — TELEPHONE ENCOUNTER
----- Message from Chris Oswald MD sent at 5/16/2024  8:03 AM EDT -----  Labs are normal.  Still waiting on results of PSA.

## 2024-05-17 ENCOUNTER — TELEPHONE (OUTPATIENT)
Dept: PRIMARY CARE | Facility: CLINIC | Age: 74
End: 2024-05-17
Payer: COMMERCIAL

## 2024-05-17 LAB
PSA FREE MFR SERPL: 26 %
PSA FREE SERPL-MCNC: 0.6 NG/ML
PSA SERPL IA-MCNC: 2.3 NG/ML (ref 0–4)

## 2024-05-17 NOTE — TELEPHONE ENCOUNTER
----- Message from Chris Oswald MD sent at 5/17/2024  9:55 AM EDT -----  PSA both free and total in normal range.

## 2024-05-29 DIAGNOSIS — N40.0 BENIGN PROSTATIC HYPERPLASIA WITHOUT LOWER URINARY TRACT SYMPTOMS: ICD-10-CM

## 2024-05-29 DIAGNOSIS — Z12.5 SCREENING PSA (PROSTATE SPECIFIC ANTIGEN): ICD-10-CM

## 2024-05-29 LAB — PSA SERPL-MCNC: 2.04 NG/ML (ref 0–4)

## 2024-06-03 ENCOUNTER — HOSPITAL ENCOUNTER (EMERGENCY)
Facility: HOSPITAL | Age: 74
Discharge: HOME | End: 2024-06-03
Payer: MEDICARE

## 2024-06-03 ENCOUNTER — APPOINTMENT (OUTPATIENT)
Dept: RADIOLOGY | Facility: HOSPITAL | Age: 74
End: 2024-06-03
Payer: MEDICARE

## 2024-06-03 VITALS
HEIGHT: 71 IN | DIASTOLIC BLOOD PRESSURE: 93 MMHG | SYSTOLIC BLOOD PRESSURE: 150 MMHG | TEMPERATURE: 97 F | RESPIRATION RATE: 16 BRPM | HEART RATE: 82 BPM | WEIGHT: 185 LBS | OXYGEN SATURATION: 95 % | BODY MASS INDEX: 25.9 KG/M2

## 2024-06-03 DIAGNOSIS — S61.219A FINGER LACERATION, INITIAL ENCOUNTER: Primary | ICD-10-CM

## 2024-06-03 PROCEDURE — 2500000004 HC RX 250 GENERAL PHARMACY W/ HCPCS (ALT 636 FOR OP/ED): Performed by: PHYSICIAN ASSISTANT

## 2024-06-03 PROCEDURE — 73140 X-RAY EXAM OF FINGER(S): CPT | Mod: LT

## 2024-06-03 PROCEDURE — 73140 X-RAY EXAM OF FINGER(S): CPT | Mod: LEFT SIDE | Performed by: RADIOLOGY

## 2024-06-03 PROCEDURE — 90471 IMMUNIZATION ADMIN: CPT | Performed by: PHYSICIAN ASSISTANT

## 2024-06-03 PROCEDURE — 99283 EMERGENCY DEPT VISIT LOW MDM: CPT | Mod: 25

## 2024-06-03 PROCEDURE — 2500000005 HC RX 250 GENERAL PHARMACY W/O HCPCS: Performed by: PHYSICIAN ASSISTANT

## 2024-06-03 PROCEDURE — 12001 RPR S/N/AX/GEN/TRNK 2.5CM/<: CPT | Performed by: PHYSICIAN ASSISTANT

## 2024-06-03 PROCEDURE — 90715 TDAP VACCINE 7 YRS/> IM: CPT | Performed by: PHYSICIAN ASSISTANT

## 2024-06-03 RX ORDER — CLINDAMYCIN HYDROCHLORIDE 300 MG/1
300 CAPSULE ORAL 4 TIMES DAILY
Qty: 20 CAPSULE | Refills: 0 | Status: SHIPPED | OUTPATIENT
Start: 2024-06-03 | End: 2024-06-08

## 2024-06-03 RX ORDER — LIDOCAINE HYDROCHLORIDE 10 MG/ML
5 INJECTION INFILTRATION; PERINEURAL ONCE
Status: COMPLETED | OUTPATIENT
Start: 2024-06-03 | End: 2024-06-03

## 2024-06-03 RX ADMIN — LIDOCAINE HYDROCHLORIDE 50 MG: 10 INJECTION, SOLUTION INFILTRATION; PERINEURAL at 13:54

## 2024-06-03 RX ADMIN — TETANUS TOXOID, REDUCED DIPHTHERIA TOXOID AND ACELLULAR PERTUSSIS VACCINE, ADSORBED 0.5 ML: 5; 2.5; 8; 8; 2.5 SUSPENSION INTRAMUSCULAR at 13:54

## 2024-06-03 ASSESSMENT — COLUMBIA-SUICIDE SEVERITY RATING SCALE - C-SSRS
2. HAVE YOU ACTUALLY HAD ANY THOUGHTS OF KILLING YOURSELF?: NO
1. IN THE PAST MONTH, HAVE YOU WISHED YOU WERE DEAD OR WISHED YOU COULD GO TO SLEEP AND NOT WAKE UP?: NO
6. HAVE YOU EVER DONE ANYTHING, STARTED TO DO ANYTHING, OR PREPARED TO DO ANYTHING TO END YOUR LIFE?: NO

## 2024-06-03 ASSESSMENT — PAIN - FUNCTIONAL ASSESSMENT: PAIN_FUNCTIONAL_ASSESSMENT: 0-10

## 2024-06-03 ASSESSMENT — LIFESTYLE VARIABLES
HAVE PEOPLE ANNOYED YOU BY CRITICIZING YOUR DRINKING: NO
EVER HAD A DRINK FIRST THING IN THE MORNING TO STEADY YOUR NERVES TO GET RID OF A HANGOVER: NO
HAVE YOU EVER FELT YOU SHOULD CUT DOWN ON YOUR DRINKING: NO
EVER FELT BAD OR GUILTY ABOUT YOUR DRINKING: NO
TOTAL SCORE: 0

## 2024-06-03 ASSESSMENT — PAIN SCALES - GENERAL: PAINLEVEL_OUTOF10: 1

## 2024-06-03 NOTE — ED PROVIDER NOTES
"HPI   Chief Complaint   Patient presents with    Laceration     'Cut left pointer finger in the truck door.  I pulled it out of closed door.  No thinner.\"       74-year-old male presents the emergency department for complaints of a laceration of the left index finger.  States he excellently slammed his finger in his truck door.  He complains of bleeding and some pain.  Denies sensorimotor deficits.  Reports a chronic deformity of the distal fingertip due to arthritis.  Unsure when his last tetanus vaccination was.  He is not diabetic.                          [place] Coma Scale Score: 15                     Patient History   Past Medical History:   Diagnosis Date    Back pain     BPH (benign prostatic hyperplasia)     COVID-19     VACCINATED    Eczema     Encounter for screening for lipoid disorders 06/24/2019    Encounter for lipid screening for cardiovascular disease    Hyperlipidemia     Hypertension     Hypothyroidism     Osteoarthritis     Other malaise 06/24/2019    Malaise and fatigue    Pain in right foot 06/24/2019    Right foot pain    Pain in right hand 06/24/2019    Pain in both hands    Personal history of other specified conditions 06/24/2019    History of nasal congestion    Personal history of other specified conditions 06/24/2019    History of urinary hesitancy    Wears glasses      Past Surgical History:   Procedure Laterality Date    COLONOSCOPY      CYST REMOVAL      RIGHT KNUCKLE AND RIGHT MIDDLE FINGER    ROTATOR CUFF REPAIR Right      Family History   Problem Relation Name Age of Onset    Hypertension Mother       Social History     Tobacco Use    Smoking status: Never    Smokeless tobacco: Never   Vaping Use    Vaping status: Never Used   Substance Use Topics    Alcohol use: Yes     Comment: 2 GLASSES WINE ONCE A WEEK    Drug use: Never       Physical Exam   ED Triage Vitals [06/03/24 1239]   Temperature Heart Rate Respirations BP   36.1 °C (97 °F) 82 16 (!) 150/93      Pulse Ox Temp Source " Heart Rate Source Patient Position   95 % Temporal Monitor Sitting      BP Location FiO2 (%)     Right arm --       Physical Exam  Constitutional:       General: He is not in acute distress.  Cardiovascular:      Rate and Rhythm: Normal rate.   Pulmonary:      Effort: Pulmonary effort is normal.   Musculoskeletal:      Comments: Chronic deformity of the distal left index finger.  Palmar crease at the DIP joint there is a 1.5 cm full-thickness laceration.  Full range of motion of the finger is intact.  Sensation intact.  Capillary refill less than 2 seconds.   Skin:     General: Skin is warm and dry.   Neurological:      General: No focal deficit present.      Mental Status: He is alert and oriented to person, place, and time.         ED Course & MDM   Diagnoses as of 06/03/24 1438   Finger laceration, initial encounter       Medical Decision Making  74-year-old male presents the emergency department for complaints of a laceration of his distal left index finger after slamming it in the car door.  On my exam, he is neurovascularly intact.  He has a full-thickness laceration, 1.5 cm at the crease of the DIP joint of his left index finger.  X-ray of the left index finger interpreted by me: No acute fracture, subluxation, dislocation.  Confirmed by radiology.  I discussed with the patient the importance of following up for a wound check in 2-3 days. I instructed the patient to keep the wound clean and dry. Patient instructed to not get the wound wet for 24 hours and not to soak it until sutures are removed. Signs of infection were discussed at length.   Discussed results with patient and/or family/friend and recommended close follow up with primary care or specialist.  Reviewed return precautions at length.  I answered all questions.           Procedure  Procedures     Neida Huerta PA-C  06/03/24 143

## 2024-06-03 NOTE — ED PROCEDURE NOTE
Procedure  Laceration Repair    Performed by: Neida Huerta PA-C  Authorized by: Neida Huerta PA-C    Consent:     Consent obtained:  Verbal    Consent given by:  Patient    Risks, benefits, and alternatives were discussed: yes      Risks discussed:  Infection, need for additional repair, nerve damage, poor wound healing, poor cosmetic result, pain, retained foreign body, tendon damage and vascular damage    Alternatives discussed:  No treatment  Universal protocol:     Procedure explained and questions answered to patient or proxy's satisfaction: yes      Imaging studies available: yes      Patient identity confirmed:  Verbally with patient  Anesthesia:     Anesthesia method:  Nerve block    Block location:  Digital    Block anesthetic:  Lidocaine 1% w/o epi    Block technique:  Wing    Block injection procedure:  Anatomic landmarks identified, anatomic landmarks palpated, negative aspiration for blood, introduced needle and incremental injection    Block outcome:  Anesthesia achieved  Laceration details:     Location:  Finger    Finger location:  L index finger    Length (cm):  1.5  Pre-procedure details:     Preparation:  Patient was prepped and draped in usual sterile fashion and imaging obtained to evaluate for foreign bodies  Treatment:     Area cleansed with:  Chlorhexidine    Amount of cleaning:  Standard    Debridement:  None  Skin repair:     Repair method:  Sutures    Suture size:  4-0    Suture material:  Prolene    Suture technique:  Simple interrupted    Number of sutures:  5  Approximation:     Approximation:  Close  Repair type:     Repair type:  Simple  Post-procedure details:     Dressing:  Bulky dressing               Neida Huerta PA-C  06/03/24 4925

## 2024-06-12 NOTE — PROGRESS NOTES
Subjective   Patient ID: Nic Valdes Jr. is a 74 y.o. male who presents for Suture / Staple Removal and Nail Problem.  HPI    Patient presents in the office today for stitch removal. Patient has 5 stitches in the left pointer finger that was placed on 6/3/24 at [facility] after cutting it on a truck door after slamming the door on his finger. Patient states he is doing fine. The ER prescribed the patient Clindamycin 300mg. Patient also received a tetanus shot.    Patient states he would like his big toes to be checked for toe nail fungus. Patient states he has been on medication previously for this but it ran out and the medication did not make a difference. Ongoing X 1 year.       Review of Systems  Constitutional:  no chills, no fever and no night sweats.  Eyes: no blurred vision and no eyesight problems.  ENT: no hearing loss, no nasal congestion, no hoarseness and no sore throat.  Neck: no mass (es) and no swelling.  Cardiovascular: no chest pain, no intermittent leg claudication, no lower extremity edema, no palpitation and no syncope.  Respiratory: no cough, no shortness of breath during exertion, no shortness of breath at rest and no wheezing.  Gastrointestinal: no abdominal pain, no blood in stools, no constipation, no diarrhea, no melena, no nausea, no rectal pain and no vomiting.  Genitourinary: no dysuria, no change in urinary frequency, no urinary hesitancy and no feelings of urinary urgency.  Musculoskeletal: no arthralgias, no back pain and no myalgias.  Integumentary: no new skin lesions and no rashes.  Neurological: no difficulty walking, no headache, no limb weakness, no numbness and no tingling.  Psychiatric/Behavioral: no anxiety, no depression, no anhedonia and no substance use disorders.  Endocrine: no recent weight gain and no recent weight loss.  Hematologic/Lymphatic: no tendency for easy bruising and no swollen glands    Objective   Physical Exam  Patient in for follow-up  "laceration left index finger distal digit sutures to be removed exam shows normal healing sutures x 5 removed without difficulty no need for Steri-Strips activity as tolerated follow-up routinely for his general health in 2 months.  /80 (BP Location: Left arm, Patient Position: Sitting)   Pulse 99   Temp 36.2 °C (97.1 °F) (Temporal)   Ht 1.803 m (5' 11\")   Wt 82.2 kg (181 lb 3.2 oz)   SpO2 96%   BMI 25.27 kg/m²     Lab Results   Component Value Date    WBC 5.1 05/14/2024    HGB 14.7 05/14/2024    HCT 45.1 05/14/2024    MCV 94 05/14/2024     05/14/2024       Assessment/Plan   Problem List Items Addressed This Visit    None  Visit Diagnoses       Encounter for removal of sutures    -  Primary          "

## 2024-06-13 ENCOUNTER — OFFICE VISIT (OUTPATIENT)
Dept: UROLOGY | Age: 74
End: 2024-06-13
Payer: MEDICARE

## 2024-06-13 VITALS
HEIGHT: 70 IN | DIASTOLIC BLOOD PRESSURE: 72 MMHG | BODY MASS INDEX: 26.34 KG/M2 | HEART RATE: 85 BPM | SYSTOLIC BLOOD PRESSURE: 110 MMHG | WEIGHT: 184 LBS

## 2024-06-13 DIAGNOSIS — Z12.5 SCREENING PSA (PROSTATE SPECIFIC ANTIGEN): ICD-10-CM

## 2024-06-13 DIAGNOSIS — N40.0 BENIGN PROSTATIC HYPERPLASIA WITHOUT LOWER URINARY TRACT SYMPTOMS: Primary | ICD-10-CM

## 2024-06-13 PROCEDURE — 3017F COLORECTAL CA SCREEN DOC REV: CPT | Performed by: PHYSICIAN ASSISTANT

## 2024-06-13 PROCEDURE — 3078F DIAST BP <80 MM HG: CPT | Performed by: PHYSICIAN ASSISTANT

## 2024-06-13 PROCEDURE — 3074F SYST BP LT 130 MM HG: CPT | Performed by: PHYSICIAN ASSISTANT

## 2024-06-13 PROCEDURE — 1123F ACP DISCUSS/DSCN MKR DOCD: CPT | Performed by: PHYSICIAN ASSISTANT

## 2024-06-13 PROCEDURE — 1036F TOBACCO NON-USER: CPT | Performed by: PHYSICIAN ASSISTANT

## 2024-06-13 PROCEDURE — G8419 CALC BMI OUT NRM PARAM NOF/U: HCPCS | Performed by: PHYSICIAN ASSISTANT

## 2024-06-13 PROCEDURE — G8427 DOCREV CUR MEDS BY ELIG CLIN: HCPCS | Performed by: PHYSICIAN ASSISTANT

## 2024-06-13 PROCEDURE — 99213 OFFICE O/P EST LOW 20 MIN: CPT | Performed by: PHYSICIAN ASSISTANT

## 2024-06-13 RX ORDER — FINASTERIDE 5 MG/1
5 TABLET, FILM COATED ORAL DAILY
Qty: 90 TABLET | Refills: 3 | Status: SHIPPED | OUTPATIENT
Start: 2024-06-13

## 2024-06-13 RX ORDER — SILDENAFIL 100 MG/1
100 TABLET, FILM COATED ORAL PRN
Qty: 30 TABLET | Refills: 2 | Status: SHIPPED | OUTPATIENT
Start: 2024-06-13

## 2024-06-13 RX ORDER — TAMSULOSIN HYDROCHLORIDE 0.4 MG/1
0.4 CAPSULE ORAL DAILY
Qty: 90 CAPSULE | Refills: 3 | Status: SHIPPED | OUTPATIENT
Start: 2024-06-13

## 2024-06-13 ASSESSMENT — ENCOUNTER SYMPTOMS: APNEA: 0

## 2024-06-13 NOTE — PROGRESS NOTES
Subjective:      Patient ID: Peter Rivas Jr. is a 74 y.o. male    HPI73 year old male who presents for a follow up appointment for BPH. States that he gets up 1-2 x's per night. Denies urinary frequency and urgency. States that he has a good stream. Denies hematuria or dysuria. PSA level was 2.04 on 5/29/24 and previously it was 1.95 on 5/24/23.     Past Medical History:   Diagnosis Date    Borderline hypertension     BPH (benign prostatic hypertrophy)     Elevated PSA     Erectile dysfunction     HTN (hypertension) 1/19/2015    Hyperlipidemia     Hypothyroidism     Osteoarthritis     Rotator cuff syndrome of left shoulder 9/30/2013     Past Surgical History:   Procedure Laterality Date    CARPAL TUNNEL RELEASE Right 11/2023    COLONOSCOPY  05/13/2013    DR. MACK VELÁZQUEZ    CYST REMOVAL Left 04/2018    left side of neck    ROTATOR CUFF REPAIR      right     Social History     Socioeconomic History    Marital status: Single     Spouse name: None    Number of children: None    Years of education: None    Highest education level: None   Tobacco Use    Smoking status: Never    Smokeless tobacco: Never   Substance and Sexual Activity    Alcohol use: Yes    Drug use: No    Sexual activity: Yes     Partners: Female     Family History   Problem Relation Age of Onset    High Blood Pressure Mother      Current Outpatient Medications   Medication Sig Dispense Refill    tamsulosin (FLOMAX) 0.4 MG capsule Take 1 capsule by mouth daily 90 capsule 3    finasteride (PROSCAR) 5 MG tablet Take 1 tablet by mouth daily 90 tablet 3    sildenafil (VIAGRA) 100 MG tablet Take 1 tablet by mouth as needed for Erectile Dysfunction 30 tablet 2    levothyroxine (SYNTHROID) 100 MCG tablet Take 1 tablet by mouth daily      finasteride (PROSCAR) 5 MG tablet Take 1 tablet by mouth daily 90 tablet 3    Glucosamine-Chondroitin 0158-0531 MG/30ML LIQD Take by mouth      zinc gluconate 50 MG tablet Take 1 tablet by mouth daily      VITAMIN E PO Take by

## 2024-06-14 ENCOUNTER — APPOINTMENT (OUTPATIENT)
Dept: PRIMARY CARE | Facility: CLINIC | Age: 74
End: 2024-06-14
Payer: MEDICARE

## 2024-06-14 VITALS
HEART RATE: 99 BPM | OXYGEN SATURATION: 96 % | TEMPERATURE: 97.1 F | WEIGHT: 181.2 LBS | SYSTOLIC BLOOD PRESSURE: 138 MMHG | DIASTOLIC BLOOD PRESSURE: 80 MMHG | HEIGHT: 71 IN | BODY MASS INDEX: 25.37 KG/M2

## 2024-06-14 DIAGNOSIS — Z48.02 ENCOUNTER FOR REMOVAL OF SUTURES: Primary | ICD-10-CM

## 2024-06-14 DIAGNOSIS — L60.9 NAIL PROBLEM: ICD-10-CM

## 2024-06-14 DIAGNOSIS — B35.1 NAIL FUNGUS: ICD-10-CM

## 2024-06-14 PROCEDURE — 1159F MED LIST DOCD IN RCRD: CPT | Performed by: FAMILY MEDICINE

## 2024-06-14 PROCEDURE — 3079F DIAST BP 80-89 MM HG: CPT | Performed by: FAMILY MEDICINE

## 2024-06-14 PROCEDURE — 1036F TOBACCO NON-USER: CPT | Performed by: FAMILY MEDICINE

## 2024-06-14 PROCEDURE — 99212 OFFICE O/P EST SF 10 MIN: CPT | Performed by: FAMILY MEDICINE

## 2024-06-14 PROCEDURE — 3008F BODY MASS INDEX DOCD: CPT | Performed by: FAMILY MEDICINE

## 2024-06-14 PROCEDURE — S0630 REMOVAL OF SUTURES: HCPCS | Performed by: FAMILY MEDICINE

## 2024-06-14 PROCEDURE — 3075F SYST BP GE 130 - 139MM HG: CPT | Performed by: FAMILY MEDICINE

## 2024-06-14 PROCEDURE — 1157F ADVNC CARE PLAN IN RCRD: CPT | Performed by: FAMILY MEDICINE

## 2024-06-14 RX ORDER — UBIDECARENONE 30 MG
30 CAPSULE ORAL DAILY
COMMUNITY

## 2024-06-14 RX ORDER — TERBINAFINE HYDROCHLORIDE 250 MG/1
250 TABLET ORAL DAILY
Qty: 90 TABLET | Refills: 1 | Status: SHIPPED | OUTPATIENT
Start: 2024-06-14

## 2024-06-14 ASSESSMENT — PATIENT HEALTH QUESTIONNAIRE - PHQ9
2. FEELING DOWN, DEPRESSED OR HOPELESS: NOT AT ALL
1. LITTLE INTEREST OR PLEASURE IN DOING THINGS: NOT AT ALL
SUM OF ALL RESPONSES TO PHQ9 QUESTIONS 1 AND 2: 0

## 2024-10-02 DIAGNOSIS — M54.9 DORSALGIA: ICD-10-CM

## 2024-10-02 RX ORDER — IBUPROFEN 800 MG/1
TABLET ORAL
Qty: 90 TABLET | Refills: 0 | Status: SHIPPED | OUTPATIENT
Start: 2024-10-02

## 2024-11-12 ENCOUNTER — APPOINTMENT (OUTPATIENT)
Dept: PRIMARY CARE | Facility: CLINIC | Age: 74
End: 2024-11-12
Payer: COMMERCIAL

## 2024-11-19 NOTE — PROGRESS NOTES
Subjective   Patient ID: Nic Valdes Jr. is a 74 y.o. male who presents for Medicare Annual Wellness Visit Subsequent and Hypothyroidism.  HPI    Patient presents in the office today for a Medicare Annual Wellness Visit.      Patient presents in office today for nail fungus. Patient has been taking terbinafine for the last 6 months. Admits that this is working well for him.     Hypothyroidism  Taking Synthroid 100 mcg  Taking on an empty stomach  No significant weight loss/gain  No heat/cold intolerances  No increase in hair loss/dry skin     Advanced Care Planning  Nic Valdes Jr. and I discussed their advance care plan preferences such as living will, and durable health care power of , which include: yes Attempt Resuscitation, depends Intubate & Ventilate, yes Comfort Care or Life- Sustaning Care. I reminded patient to talk with their health care agent, Tamica Valdes, about their health care goals. Note reflects patient's free will and care goals as expressed to me.     Review of Systems  Constitutional:  no chills, no fever and no night sweats.  Eyes: no blurred vision and no eyesight problems.  ENT: no hearing loss, no nasal congestion, no hoarseness and no sore throat.  Neck: no mass (es) and no swelling.  Cardiovascular: no chest pain, no intermittent leg claudication, no lower extremity edema, no palpitation and no syncope.  Respiratory: no cough, no shortness of breath during exertion, no shortness of breath at rest and no wheezing.  Gastrointestinal: no abdominal pain, no blood in stools, no constipation, no diarrhea, no melena, no nausea, no rectal pain and no vomiting.  Genitourinary: no dysuria, no change in urinary frequency, no urinary hesitancy and no feelings of urinary urgency.  Musculoskeletal: no arthralgias, no back pain and no myalgias.  Integumentary: no new skin lesions and no rashes.  Neurological: no difficulty walking, no headache, no limb weakness, no numbness and no  "tingling.  Psychiatric/Behavioral: no anxiety, no depression, no anhedonia and no substance use disorders.  Endocrine: no recent weight gain and no recent weight loss.  Hematologic/Lymphatic: no tendency for easy bruising and no swollen glands    Objective   Physical Exam  Patient in for follow-up Medicare wellness exam history of hypertension hyperlipidemia onychomycosis to nails she states they are doing better but would like 1 more refill of the Lamisil.  Well-developed well-nourished muscular male in no acute distress.  Denies chest pain or shortness of breath with exertion.  Physical exam today's office visit constitutional alert and oriented x3.    Head is atraumatic HEENT is within normal limits.    Neck supple no masses full range of motion.    Thyroid is normal in size no thyromegaly there is no carotid bruits.    Pulmonary exam shows clear to auscultation no respiratory distress.    Cardiovascular shows no murmur rub or gallop.  Regular rate and rhythm.    Abdominal exam soft nontender no hepatosplenomegaly or masses normal bowel sounds no rebound no guarding.    Musculoskeletal exam no joint pain no muscle pain full range of motion.    Psych exam normal mood and affect.    Dermatologic exam no skin lesions no rash no blemishes.    Neuro exam is no focal deficits.  Normal exam.    Extremities no edema normal pulses normal capillary refill.    /82   Resp 18   Ht 1.778 m (5' 10\")   Wt 84.4 kg (186 lb)   BMI 26.69 kg/m²     Lab Results   Component Value Date    WBC 5.1 05/14/2024    HGB 14.7 05/14/2024    HCT 45.1 05/14/2024    MCV 94 05/14/2024     05/14/2024       Assessment/Plan fungus on the right great toe shows half of the toenail to be growing out normally.  Will continue the Lamisil for 3 more months and then DC refill his medication routine recheck 6 months blood work repeat at that point.  Problem List Items Addressed This Visit       BPH (benign prostatic hyperplasia)    " Hypothyroidism    Essential hypertension    Hyperlipidemia     Other Visit Diagnoses       Medicare annual wellness visit, subsequent        Nail fungus        Hypothyroidism, unspecified

## 2024-11-23 DIAGNOSIS — B35.1 NAIL FUNGUS: ICD-10-CM

## 2024-11-25 ENCOUNTER — APPOINTMENT (OUTPATIENT)
Dept: PRIMARY CARE | Facility: CLINIC | Age: 74
End: 2024-11-25
Payer: MEDICARE

## 2024-11-25 VITALS
BODY MASS INDEX: 26.63 KG/M2 | HEIGHT: 70 IN | DIASTOLIC BLOOD PRESSURE: 82 MMHG | WEIGHT: 186 LBS | RESPIRATION RATE: 18 BRPM | SYSTOLIC BLOOD PRESSURE: 128 MMHG

## 2024-11-25 DIAGNOSIS — I10 ESSENTIAL HYPERTENSION: ICD-10-CM

## 2024-11-25 DIAGNOSIS — Z00.00 MEDICARE ANNUAL WELLNESS VISIT, SUBSEQUENT: ICD-10-CM

## 2024-11-25 DIAGNOSIS — E78.5 HYPERLIPIDEMIA, UNSPECIFIED HYPERLIPIDEMIA TYPE: ICD-10-CM

## 2024-11-25 DIAGNOSIS — E03.9 HYPOTHYROIDISM, UNSPECIFIED: ICD-10-CM

## 2024-11-25 DIAGNOSIS — N40.0 BENIGN PROSTATIC HYPERPLASIA, UNSPECIFIED WHETHER LOWER URINARY TRACT SYMPTOMS PRESENT: ICD-10-CM

## 2024-11-25 DIAGNOSIS — B35.1 NAIL FUNGUS: ICD-10-CM

## 2024-11-25 DIAGNOSIS — E03.9 HYPOTHYROIDISM, UNSPECIFIED TYPE: ICD-10-CM

## 2024-11-25 PROCEDURE — 1159F MED LIST DOCD IN RCRD: CPT | Performed by: FAMILY MEDICINE

## 2024-11-25 PROCEDURE — 1157F ADVNC CARE PLAN IN RCRD: CPT | Performed by: FAMILY MEDICINE

## 2024-11-25 PROCEDURE — 3008F BODY MASS INDEX DOCD: CPT | Performed by: FAMILY MEDICINE

## 2024-11-25 PROCEDURE — G0439 PPPS, SUBSEQ VISIT: HCPCS | Performed by: FAMILY MEDICINE

## 2024-11-25 PROCEDURE — 1036F TOBACCO NON-USER: CPT | Performed by: FAMILY MEDICINE

## 2024-11-25 PROCEDURE — 3074F SYST BP LT 130 MM HG: CPT | Performed by: FAMILY MEDICINE

## 2024-11-25 PROCEDURE — 3079F DIAST BP 80-89 MM HG: CPT | Performed by: FAMILY MEDICINE

## 2024-11-25 PROCEDURE — 1170F FXNL STATUS ASSESSED: CPT | Performed by: FAMILY MEDICINE

## 2024-11-25 PROCEDURE — 1123F ACP DISCUSS/DSCN MKR DOCD: CPT | Performed by: FAMILY MEDICINE

## 2024-11-25 RX ORDER — LEVOTHYROXINE SODIUM 100 UG/1
100 TABLET ORAL DAILY
Qty: 90 TABLET | Refills: 1 | Status: SHIPPED | OUTPATIENT
Start: 2024-11-25

## 2024-11-25 RX ORDER — TERBINAFINE HYDROCHLORIDE 250 MG/1
250 TABLET ORAL DAILY
Qty: 90 TABLET | Refills: 0 | Status: SHIPPED | OUTPATIENT
Start: 2024-11-25 | End: 2024-11-25 | Stop reason: SDUPTHER

## 2024-11-25 RX ORDER — TERBINAFINE HYDROCHLORIDE 250 MG/1
250 TABLET ORAL DAILY
Qty: 90 TABLET | Refills: 0 | Status: SHIPPED | OUTPATIENT
Start: 2024-11-25

## 2024-11-25 RX ORDER — TAMSULOSIN HYDROCHLORIDE 0.4 MG/1
0.4 CAPSULE ORAL DAILY
Qty: 90 CAPSULE | Refills: 1 | Status: SHIPPED | OUTPATIENT
Start: 2024-11-25

## 2024-11-25 ASSESSMENT — ACTIVITIES OF DAILY LIVING (ADL)
TAKING_MEDICATION: INDEPENDENT
MANAGING_FINANCES: INDEPENDENT
DRESSING: INDEPENDENT
DOING_HOUSEWORK: INDEPENDENT
GROCERY_SHOPPING: INDEPENDENT
BATHING: INDEPENDENT

## 2024-11-25 NOTE — TELEPHONE ENCOUNTER
Patient has been on this medication for 6 months, do you want this medication refilled?  Please advise.

## 2025-03-26 DIAGNOSIS — N40.0 BENIGN PROSTATIC HYPERPLASIA WITHOUT LOWER URINARY TRACT SYMPTOMS: ICD-10-CM

## 2025-03-26 RX ORDER — FINASTERIDE 5 MG/1
5 TABLET, FILM COATED ORAL DAILY
Qty: 90 TABLET | Refills: 3 | Status: SHIPPED | OUTPATIENT
Start: 2025-03-26

## 2025-05-27 ENCOUNTER — APPOINTMENT (OUTPATIENT)
Dept: PRIMARY CARE | Facility: CLINIC | Age: 75
End: 2025-05-27
Payer: COMMERCIAL

## 2025-05-27 VITALS
RESPIRATION RATE: 18 BRPM | WEIGHT: 188 LBS | SYSTOLIC BLOOD PRESSURE: 120 MMHG | DIASTOLIC BLOOD PRESSURE: 88 MMHG | BODY MASS INDEX: 27.85 KG/M2 | HEIGHT: 69 IN

## 2025-05-27 DIAGNOSIS — E03.9 HYPOTHYROIDISM, UNSPECIFIED TYPE: Primary | ICD-10-CM

## 2025-05-27 DIAGNOSIS — B35.1 NAIL FUNGUS: ICD-10-CM

## 2025-05-27 DIAGNOSIS — M54.9 DORSALGIA: ICD-10-CM

## 2025-05-27 DIAGNOSIS — E03.9 HYPOTHYROIDISM, UNSPECIFIED: ICD-10-CM

## 2025-05-27 PROCEDURE — 3008F BODY MASS INDEX DOCD: CPT | Performed by: FAMILY MEDICINE

## 2025-05-27 PROCEDURE — 3074F SYST BP LT 130 MM HG: CPT | Performed by: FAMILY MEDICINE

## 2025-05-27 PROCEDURE — 1157F ADVNC CARE PLAN IN RCRD: CPT | Performed by: FAMILY MEDICINE

## 2025-05-27 PROCEDURE — 1123F ACP DISCUSS/DSCN MKR DOCD: CPT | Performed by: FAMILY MEDICINE

## 2025-05-27 PROCEDURE — 3079F DIAST BP 80-89 MM HG: CPT | Performed by: FAMILY MEDICINE

## 2025-05-27 PROCEDURE — 1159F MED LIST DOCD IN RCRD: CPT | Performed by: FAMILY MEDICINE

## 2025-05-27 PROCEDURE — 1036F TOBACCO NON-USER: CPT | Performed by: FAMILY MEDICINE

## 2025-05-27 PROCEDURE — 99213 OFFICE O/P EST LOW 20 MIN: CPT | Performed by: FAMILY MEDICINE

## 2025-05-27 RX ORDER — IBUPROFEN 800 MG/1
TABLET, FILM COATED ORAL
Qty: 90 TABLET | Refills: 1 | Status: SHIPPED | OUTPATIENT
Start: 2025-05-27

## 2025-05-27 RX ORDER — LEVOTHYROXINE SODIUM 100 UG/1
100 TABLET ORAL DAILY
Qty: 90 TABLET | Refills: 1 | Status: SHIPPED | OUTPATIENT
Start: 2025-05-27

## 2025-05-27 RX ORDER — TERBINAFINE HYDROCHLORIDE 250 MG/1
250 TABLET ORAL DAILY
Qty: 90 TABLET | Refills: 0 | Status: SHIPPED | OUTPATIENT
Start: 2025-05-27

## 2025-05-27 NOTE — PROGRESS NOTES
Subjective   Patient ID: Nic Valdes Jr. is a 75 y.o. male who presents for Hypothyroidism.  HPI    Hypothyroidism  Taking Synthroid 100 mcg  Taking on an empty stomach  No significant weight loss/gain  No heat/cold intolerances  No increase in hair loss/dry skin     Review of Systems  Constitutional:  no chills, no fever and no night sweats.  Eyes: no blurred vision and no eyesight problems.  ENT: no hearing loss, no nasal congestion, no hoarseness and no sore throat.  Neck: no mass (es) and no swelling.  Cardiovascular: no chest pain, no intermittent leg claudication, no lower extremity edema, no palpitation and no syncope.  Respiratory: no cough, no shortness of breath during exertion, no shortness of breath at rest and no wheezing.  Gastrointestinal: no abdominal pain, no blood in stools, no constipation, no diarrhea, no melena, no nausea, no rectal pain and no vomiting.  Genitourinary: no dysuria, no change in urinary frequency, no urinary hesitancy and no feelings of urinary urgency.  Musculoskeletal: no arthralgias, no back pain and no myalgias.  Integumentary: no new skin lesions and no rashes.  Neurological: no difficulty walking, no headache, no limb weakness, no numbness and no tingling.  Psychiatric/Behavioral: no anxiety, no depression, no anhedonia and no substance use disorders.  Endocrine: no recent weight gain and no recent weight loss.  Hematologic/Lymphatic: no tendency for easy bruising and no swollen glands    Objective   Physical Exam  Patient in for follow-up exam.  Doing well no complaints.  He would like to try 1 more round of the Lamisil to get rid of the nail fungus hepta that he is advised we will have to stop.  Blood pressure is good last lab work showed thyroid other levels to be normal.  Well-developed well-nourished male in no acute distress.physical exam today's office visit constitutional alert and oriented x3.    Head is atraumatic HEENT is within normal limits.    Neck supple no  "masses full range of motion.    Thyroid is normal in size no thyromegaly there is no carotid bruits.    Pulmonary exam shows clear to auscultation no respiratory distress.    Cardiovascular shows no murmur rub or gallop.  Regular rate and rhythm.    Abdominal exam soft nontender no hepatosplenomegaly or masses normal bowel sounds no rebound no guarding.    Musculoskeletal exam no joint pain no muscle pain full range of motion.    Psych exam normal mood and affect.    Dermatologic exam no skin lesions no rash no blemishes.    Neuro exam is no focal deficits.  Normal exam.    Extremities no edema normal pulses normal capillary refill.    Resp 18   Ht 1.753 m (5' 9\")   Wt 85.3 kg (188 lb)   BMI 27.76 kg/m²     Lab Results   Component Value Date    WBC 5.1 05/14/2024    HGB 14.7 05/14/2024    HCT 45.1 05/14/2024    MCV 94 05/14/2024     05/14/2024       Assessment/Plan continue current treatment course routine recheck 6 months or as needed  Problem List Items Addressed This Visit       Hypothyroidism - Primary     Other Visit Diagnoses         Nail fungus          Hypothyroidism, unspecified          Dorsalgia              "

## 2025-06-09 DIAGNOSIS — N40.0 BENIGN PROSTATIC HYPERPLASIA WITHOUT LOWER URINARY TRACT SYMPTOMS: ICD-10-CM

## 2025-06-09 DIAGNOSIS — Z12.5 SCREENING PSA (PROSTATE SPECIFIC ANTIGEN): ICD-10-CM

## 2025-06-09 LAB — PSA SERPL-MCNC: 2.21 NG/ML (ref 0–4)

## 2025-06-13 ENCOUNTER — OFFICE VISIT (OUTPATIENT)
Age: 75
End: 2025-06-13
Payer: MEDICARE

## 2025-06-13 VITALS
SYSTOLIC BLOOD PRESSURE: 122 MMHG | HEART RATE: 95 BPM | BODY MASS INDEX: 26.34 KG/M2 | HEIGHT: 70 IN | DIASTOLIC BLOOD PRESSURE: 74 MMHG | WEIGHT: 184 LBS

## 2025-06-13 DIAGNOSIS — N13.8 BENIGN PROSTATIC HYPERPLASIA WITH URINARY OBSTRUCTION: Primary | ICD-10-CM

## 2025-06-13 DIAGNOSIS — N40.1 BENIGN PROSTATIC HYPERPLASIA WITH URINARY OBSTRUCTION: Primary | ICD-10-CM

## 2025-06-13 PROCEDURE — 1123F ACP DISCUSS/DSCN MKR DOCD: CPT | Performed by: PHYSICIAN ASSISTANT

## 2025-06-13 PROCEDURE — 99212 OFFICE O/P EST SF 10 MIN: CPT | Performed by: PHYSICIAN ASSISTANT

## 2025-06-13 PROCEDURE — 3074F SYST BP LT 130 MM HG: CPT | Performed by: PHYSICIAN ASSISTANT

## 2025-06-13 PROCEDURE — 1159F MED LIST DOCD IN RCRD: CPT | Performed by: PHYSICIAN ASSISTANT

## 2025-06-13 PROCEDURE — 3017F COLORECTAL CA SCREEN DOC REV: CPT | Performed by: PHYSICIAN ASSISTANT

## 2025-06-13 PROCEDURE — G8427 DOCREV CUR MEDS BY ELIG CLIN: HCPCS | Performed by: PHYSICIAN ASSISTANT

## 2025-06-13 PROCEDURE — 3078F DIAST BP <80 MM HG: CPT | Performed by: PHYSICIAN ASSISTANT

## 2025-06-13 PROCEDURE — 99213 OFFICE O/P EST LOW 20 MIN: CPT | Performed by: PHYSICIAN ASSISTANT

## 2025-06-13 PROCEDURE — G8419 CALC BMI OUT NRM PARAM NOF/U: HCPCS | Performed by: PHYSICIAN ASSISTANT

## 2025-06-13 PROCEDURE — 1036F TOBACCO NON-USER: CPT | Performed by: PHYSICIAN ASSISTANT

## 2025-06-13 ASSESSMENT — ENCOUNTER SYMPTOMS: APNEA: 0

## 2025-06-13 NOTE — PROGRESS NOTES
Vitamins-Minerals (THERAPEUTIC MULTIVITAMIN-MINERALS) tablet Take 1 tablet by mouth daily      Multiple Vitamins-Minerals (OCUVITE ADULT 50+ PO) Take by mouth daily      Potassium 99 MG TABS Take by mouth daily      Turmeric 450 MG CAPS Take by mouth daily      Calcium Carb-Cholecalciferol (CALCIUM+D3 PO) Take by mouth 3 times daily Calcium 1200 mg,  D3 5000IU      ibuprofen (ADVIL;MOTRIN) 800 MG tablet TAKE 1 TABLET BY MOUTH EVERY 8 HOURS AS NEEDED FOR PAIN 90 tablet 3    magnesium (MAGNESIUM-OXIDE) 250 MG TABS tablet Take 1 tablet by mouth daily      Ascorbic Acid (VITAMIN C) 500 MG CAPS Take 1,000 mg by mouth daily       Omega-3 Fatty Acids (OMEGA 3 PO) Take  by mouth.         No current facility-administered medications for this visit.     Bee venom  reviewed      Review of Systems   Constitutional:  Negative for fever.   HENT:  Negative for congestion.    Respiratory:  Negative for apnea.    Genitourinary:  Negative for hematuria.   Neurological:  Negative for speech difficulty.         Objective:   Physical Exam  HENT:      Mouth/Throat:      Mouth: Mucous membranes are moist.   Pulmonary:      Effort: Pulmonary effort is normal.   Genitourinary:     Comments: 30 gm prostate smooth   Skin:     General: Skin is warm.   Neurological:      Mental Status: He is alert and oriented to person, place, and time.          Assessment:   73 year old male who presents for a follow up appointment for BPH. States that he gets up 1-2 x's per night. Denies urinary frequency and urgency. States that he has a good stream. Denies hematuria or dysuria. PSA level was 2.21 on 6/9/25, previously it was 2.04 on 5/29/24.                   Plan:      Continue flomax and cialis  Follow up in 1 year with PSA                  Peter Holly PA-C

## 2025-06-29 DIAGNOSIS — N40.0 BENIGN PROSTATIC HYPERPLASIA, UNSPECIFIED WHETHER LOWER URINARY TRACT SYMPTOMS PRESENT: ICD-10-CM

## 2025-06-30 RX ORDER — TAMSULOSIN HYDROCHLORIDE 0.4 MG/1
0.4 CAPSULE ORAL DAILY
Qty: 90 CAPSULE | Refills: 1 | Status: SHIPPED | OUTPATIENT
Start: 2025-06-30

## 2025-12-01 ENCOUNTER — APPOINTMENT (OUTPATIENT)
Dept: PRIMARY CARE | Facility: CLINIC | Age: 75
End: 2025-12-01
Payer: MEDICARE

## (undated) DEVICE — STRAP, ARM BOARD, 32 X 1.5

## (undated) DEVICE — STRAP, VELCRO, BODY, 4 X 60IN, NS

## (undated) DEVICE — DRAPE, SHEET, 17 X 23 IN

## (undated) DEVICE — TOWELS 4-PK

## (undated) DEVICE — MANIFOLD, 4 PORT NEPTUNE STANDARD

## (undated) DEVICE — GOWN, SURGICAL, ROYAL SILK, LG, STERILE

## (undated) DEVICE — CORD, FORCEP, BIPOLAR, DISP

## (undated) DEVICE — HOLSTER, ELECTROSURGERY ACCESSORY, STERILE

## (undated) DEVICE — Device

## (undated) DEVICE — SYRINGE, 60 CC, IRRIGATION, BULB, CONTRO-BULB, PAPER POUCH

## (undated) DEVICE — BANDAGE, ESMARK 4 IN X 9 FT, STERILE

## (undated) DEVICE — STRIP, SKIN CLOSURE, STERI STRIP, REINFORCED, 0.5 X 4 IN

## (undated) DEVICE — BANDAGE, ELASTIC, MATRIX, SELF-CLOSURE, 3 IN X 5 YD, LF

## (undated) DEVICE — BANDAGE, COHESIVE, HAND TEAR, COFLEX, 2 X 5 YDS, LF

## (undated) DEVICE — GLOVE, SURGICAL, PROTEXIS PI , 6.5, PF, LF

## (undated) DEVICE — GLOVE, SURGICAL, PROTEXIS PI BLUE W/NEUTHERA, 6.5, PF, LF

## (undated) DEVICE — SUTURE, MONOCRYL, 4-0, 27 IN, PS-2, UNDYED

## (undated) DEVICE — BANDAGE, STRETCH, CONFORM, 2 IN X 4.1 YD, NS